# Patient Record
Sex: MALE | Employment: UNEMPLOYED | URBAN - METROPOLITAN AREA
[De-identification: names, ages, dates, MRNs, and addresses within clinical notes are randomized per-mention and may not be internally consistent; named-entity substitution may affect disease eponyms.]

---

## 2024-01-05 ENCOUNTER — HOSPITAL ENCOUNTER (INPATIENT)
Facility: CLINIC | Age: 39
LOS: 6 days | Discharge: NURSING, REHAB OR LONG-TERM CARE | DRG: 897 | End: 2024-01-12
Attending: EMERGENCY MEDICINE | Admitting: PSYCHIATRY & NEUROLOGY
Payer: MEDICARE

## 2024-01-05 ENCOUNTER — TELEPHONE (OUTPATIENT)
Dept: BEHAVIORAL HEALTH | Facility: CLINIC | Age: 39
End: 2024-01-05

## 2024-01-05 DIAGNOSIS — F10.90 ALCOHOL USE DISORDER: ICD-10-CM

## 2024-01-05 DIAGNOSIS — F25.1 SCHIZOAFFECTIVE DISORDER, DEPRESSIVE TYPE (H): ICD-10-CM

## 2024-01-05 LAB
ALBUMIN SERPL BCG-MCNC: 4.5 G/DL (ref 3.5–5.2)
ALBUMIN UR-MCNC: NEGATIVE MG/DL
ALCOHOL BREATH TEST: 0 (ref 0–0.01)
ALP SERPL-CCNC: 65 U/L (ref 40–150)
ALT SERPL W P-5'-P-CCNC: 12 U/L (ref 0–70)
AMMONIA PLAS-SCNC: 25 UMOL/L (ref 16–60)
AMPHETAMINES UR QL SCN: NORMAL
ANION GAP SERPL CALCULATED.3IONS-SCNC: 11 MMOL/L (ref 7–15)
APPEARANCE UR: CLEAR
AST SERPL W P-5'-P-CCNC: 19 U/L (ref 0–45)
BARBITURATES UR QL SCN: NORMAL
BASOPHILS # BLD AUTO: 0 10E3/UL (ref 0–0.2)
BASOPHILS NFR BLD AUTO: 0 %
BENZODIAZ UR QL SCN: NORMAL
BILIRUB SERPL-MCNC: 0.6 MG/DL
BILIRUB UR QL STRIP: NEGATIVE
BUN SERPL-MCNC: 6.6 MG/DL (ref 6–20)
BZE UR QL SCN: NORMAL
CALCIUM SERPL-MCNC: 9.5 MG/DL (ref 8.6–10)
CANNABINOIDS UR QL SCN: NORMAL
CHLORIDE SERPL-SCNC: 106 MMOL/L (ref 98–107)
COLOR UR AUTO: ABNORMAL
CREAT SERPL-MCNC: 0.76 MG/DL (ref 0.67–1.17)
DEPRECATED HCO3 PLAS-SCNC: 25 MMOL/L (ref 22–29)
EGFRCR SERPLBLD CKD-EPI 2021: >90 ML/MIN/1.73M2
EOSINOPHIL # BLD AUTO: 0.1 10E3/UL (ref 0–0.7)
EOSINOPHIL NFR BLD AUTO: 1 %
ERYTHROCYTE [DISTWIDTH] IN BLOOD BY AUTOMATED COUNT: 12.9 % (ref 10–15)
FENTANYL UR QL: NORMAL
GLUCOSE SERPL-MCNC: 114 MG/DL (ref 70–99)
GLUCOSE UR STRIP-MCNC: NEGATIVE MG/DL
HCT VFR BLD AUTO: 43.8 % (ref 40–53)
HGB BLD-MCNC: 15.3 G/DL (ref 13.3–17.7)
HGB UR QL STRIP: NEGATIVE
IMM GRANULOCYTES # BLD: 0 10E3/UL
IMM GRANULOCYTES NFR BLD: 0 %
KETONES UR STRIP-MCNC: ABNORMAL MG/DL
LACTATE SERPL-SCNC: 1.2 MMOL/L (ref 0.7–2)
LEUKOCYTE ESTERASE UR QL STRIP: NEGATIVE
LIPASE SERPL-CCNC: 17 U/L (ref 13–60)
LYMPHOCYTES # BLD AUTO: 0.9 10E3/UL (ref 0.8–5.3)
LYMPHOCYTES NFR BLD AUTO: 15 %
MAGNESIUM SERPL-MCNC: 1.6 MG/DL (ref 1.7–2.3)
MCH RBC QN AUTO: 32 PG (ref 26.5–33)
MCHC RBC AUTO-ENTMCNC: 34.9 G/DL (ref 31.5–36.5)
MCV RBC AUTO: 92 FL (ref 78–100)
MONOCYTES # BLD AUTO: 0.3 10E3/UL (ref 0–1.3)
MONOCYTES NFR BLD AUTO: 5 %
NEUTROPHILS # BLD AUTO: 4.7 10E3/UL (ref 1.6–8.3)
NEUTROPHILS NFR BLD AUTO: 79 %
NITRATE UR QL: NEGATIVE
NRBC # BLD AUTO: 0 10E3/UL
NRBC BLD AUTO-RTO: 0 /100
OPIATES UR QL SCN: NORMAL
PCP QUAL URINE (ROCHE): NORMAL
PH UR STRIP: 7 [PH] (ref 5–7)
PLATELET # BLD AUTO: 197 10E3/UL (ref 150–450)
POTASSIUM SERPL-SCNC: 3.8 MMOL/L (ref 3.4–5.3)
PROT SERPL-MCNC: 7.2 G/DL (ref 6.4–8.3)
RBC # BLD AUTO: 4.78 10E6/UL (ref 4.4–5.9)
RBC URINE: <1 /HPF
SODIUM SERPL-SCNC: 142 MMOL/L (ref 135–145)
SP GR UR STRIP: 1.01 (ref 1–1.03)
TROPONIN T SERPL HS-MCNC: <6 NG/L
UROBILINOGEN UR STRIP-MCNC: NORMAL MG/DL
WBC # BLD AUTO: 6 10E3/UL (ref 4–11)
WBC URINE: 0 /HPF

## 2024-01-05 PROCEDURE — 96366 THER/PROPH/DIAG IV INF ADDON: CPT | Performed by: EMERGENCY MEDICINE

## 2024-01-05 PROCEDURE — 99285 EMERGENCY DEPT VISIT HI MDM: CPT | Mod: 25 | Performed by: EMERGENCY MEDICINE

## 2024-01-05 PROCEDURE — 96361 HYDRATE IV INFUSION ADD-ON: CPT | Performed by: EMERGENCY MEDICINE

## 2024-01-05 PROCEDURE — 82140 ASSAY OF AMMONIA: CPT | Performed by: EMERGENCY MEDICINE

## 2024-01-05 PROCEDURE — 93005 ELECTROCARDIOGRAM TRACING: CPT | Performed by: EMERGENCY MEDICINE

## 2024-01-05 PROCEDURE — 85025 COMPLETE CBC W/AUTO DIFF WBC: CPT | Performed by: EMERGENCY MEDICINE

## 2024-01-05 PROCEDURE — 93010 ELECTROCARDIOGRAM REPORT: CPT | Performed by: EMERGENCY MEDICINE

## 2024-01-05 PROCEDURE — 80307 DRUG TEST PRSMV CHEM ANLYZR: CPT | Performed by: EMERGENCY MEDICINE

## 2024-01-05 PROCEDURE — 36415 COLL VENOUS BLD VENIPUNCTURE: CPT | Performed by: EMERGENCY MEDICINE

## 2024-01-05 PROCEDURE — 83690 ASSAY OF LIPASE: CPT | Performed by: EMERGENCY MEDICINE

## 2024-01-05 PROCEDURE — 96375 TX/PRO/DX INJ NEW DRUG ADDON: CPT | Performed by: EMERGENCY MEDICINE

## 2024-01-05 PROCEDURE — 96365 THER/PROPH/DIAG IV INF INIT: CPT | Performed by: EMERGENCY MEDICINE

## 2024-01-05 PROCEDURE — 82075 ASSAY OF BREATH ETHANOL: CPT | Performed by: EMERGENCY MEDICINE

## 2024-01-05 PROCEDURE — 258N000003 HC RX IP 258 OP 636: Performed by: EMERGENCY MEDICINE

## 2024-01-05 PROCEDURE — 80053 COMPREHEN METABOLIC PANEL: CPT | Performed by: EMERGENCY MEDICINE

## 2024-01-05 PROCEDURE — 84484 ASSAY OF TROPONIN QUANT: CPT | Performed by: EMERGENCY MEDICINE

## 2024-01-05 PROCEDURE — 83605 ASSAY OF LACTIC ACID: CPT | Performed by: EMERGENCY MEDICINE

## 2024-01-05 PROCEDURE — 250N000011 HC RX IP 250 OP 636: Performed by: EMERGENCY MEDICINE

## 2024-01-05 PROCEDURE — 250N000013 HC RX MED GY IP 250 OP 250 PS 637: Performed by: EMERGENCY MEDICINE

## 2024-01-05 PROCEDURE — 83735 ASSAY OF MAGNESIUM: CPT | Performed by: EMERGENCY MEDICINE

## 2024-01-05 PROCEDURE — 81001 URINALYSIS AUTO W/SCOPE: CPT | Performed by: EMERGENCY MEDICINE

## 2024-01-05 RX ORDER — SODIUM CHLORIDE, SODIUM LACTATE, POTASSIUM CHLORIDE, CALCIUM CHLORIDE 600; 310; 30; 20 MG/100ML; MG/100ML; MG/100ML; MG/100ML
INJECTION, SOLUTION INTRAVENOUS CONTINUOUS
Status: DISCONTINUED | OUTPATIENT
Start: 2024-01-05 | End: 2024-01-06

## 2024-01-05 RX ORDER — OLANZAPINE 7.5 MG/1
7.5 TABLET, FILM COATED ORAL AT BEDTIME
Status: ON HOLD | COMMUNITY
Start: 2023-03-23 | End: 2024-01-12

## 2024-01-05 RX ORDER — NALTREXONE HYDROCHLORIDE 50 MG/1
50 TABLET, FILM COATED ORAL DAILY
COMMUNITY

## 2024-01-05 RX ORDER — THIAMINE HYDROCHLORIDE 100 MG/ML
500 INJECTION, SOLUTION INTRAMUSCULAR; INTRAVENOUS ONCE
Status: COMPLETED | OUTPATIENT
Start: 2024-01-05 | End: 2024-01-05

## 2024-01-05 RX ORDER — DIAZEPAM 5 MG
5-20 TABLET ORAL EVERY 30 MIN PRN
Status: DISCONTINUED | OUTPATIENT
Start: 2024-01-05 | End: 2024-01-06

## 2024-01-05 RX ORDER — SODIUM CHLORIDE 9 MG/ML
INJECTION, SOLUTION INTRAVENOUS
Status: DISCONTINUED
Start: 2024-01-05 | End: 2024-01-05 | Stop reason: HOSPADM

## 2024-01-05 RX ORDER — DIAZEPAM 5 MG
5 TABLET ORAL ONCE
Status: COMPLETED | OUTPATIENT
Start: 2024-01-05 | End: 2024-01-05

## 2024-01-05 RX ORDER — HYDROXYZINE HYDROCHLORIDE 25 MG/1
25 TABLET, FILM COATED ORAL 2 TIMES DAILY PRN
Status: ON HOLD | COMMUNITY
End: 2024-01-12

## 2024-01-05 RX ORDER — MAGNESIUM SULFATE HEPTAHYDRATE 40 MG/ML
2 INJECTION, SOLUTION INTRAVENOUS ONCE
Status: COMPLETED | OUTPATIENT
Start: 2024-01-05 | End: 2024-01-05

## 2024-01-05 RX ORDER — POTASSIUM CHLORIDE 20MEQ/15ML
20 LIQUID (ML) ORAL ONCE
Status: COMPLETED | OUTPATIENT
Start: 2024-01-05 | End: 2024-01-05

## 2024-01-05 RX ADMIN — POTASSIUM CHLORIDE 20 MEQ: 40 SOLUTION ORAL at 17:09

## 2024-01-05 RX ADMIN — DIAZEPAM 5 MG: 5 TABLET ORAL at 15:59

## 2024-01-05 RX ADMIN — MAGNESIUM SULFATE HEPTAHYDRATE 2 G: 40 INJECTION, SOLUTION INTRAVENOUS at 17:09

## 2024-01-05 RX ADMIN — THIAMINE HYDROCHLORIDE 500 MG: 100 INJECTION, SOLUTION INTRAMUSCULAR; INTRAVENOUS at 15:57

## 2024-01-05 RX ADMIN — SODIUM CHLORIDE, POTASSIUM CHLORIDE, SODIUM LACTATE AND CALCIUM CHLORIDE: 600; 310; 30; 20 INJECTION, SOLUTION INTRAVENOUS at 15:59

## 2024-01-05 ASSESSMENT — ACTIVITIES OF DAILY LIVING (ADL)
ADLS_ACUITY_SCORE: 35

## 2024-01-05 NOTE — TELEPHONE ENCOUNTER
S: [facility] , Provider n/a calling at 5:50 PM with clinical on a 38 year old/Male presenting for alcohol detox.     B: Pt presents for ETOH detox.   Currently reports drinking 10-12 beers for months.    Patient reports last use was 12 hours ago.  Pt MICHELINE: 0  Pt  denies hx of DT  Pt  denies hx of seizures. Last seizure: N/A  Pt endorsing the following symptoms of withdrawal: Tremulous and Anxious  MSSA Score: 2 Per RN Sherine at 9:30pm 1/5/24    Pt endorses acute mental health or medical concerns.  Pt denies other drug use: Amount/frequency: N/A    Does Pt have a detox care plan in [facility]? No  Does pt present with specific needs, assistive devices, or exclusionary criteria? None  Is the patient ambulating, eating and drinking in the ED? Yes    A: Pt meets criteria to be presented for IP detox admission. Patient is voluntary    COVID Symptoms: No  If yes, COVID test required   Utox: Negative  Magnesium: Abnormalities: 1.6  CMP: Abnormalities: Glucose 114  CBC: WNL  HCG: N/A    Olanzapine regiment and is compliant.    R: Patient cleared and ready for behavioral bed placement: Yes    Pt is meeting criteria for presentation to 3A/MICD    Does Patient need a Transfer Center request created? No, Pt is located within [facility] ED, [facility] ED, or [facility]

## 2024-01-05 NOTE — ED TRIAGE NOTES
Patient states last drink was around 10pm last night, he drinks 10-12 hard ciders a day, denies a seizure history. Seeking detox.      Triage Assessment (Adult)       Row Name 01/05/24 7179          Triage Assessment    Airway WDL WDL        Respiratory WDL    Respiratory WDL WDL        Skin Circulation/Temperature WDL    Skin Circulation/Temperature WDL WDL        Cardiac WDL    Cardiac WDL WDL        Peripheral/Neurovascular WDL    Peripheral Neurovascular WDL WDL        Cognitive/Neuro/Behavioral WDL    Cognitive/Neuro/Behavioral WDL WDL

## 2024-01-05 NOTE — ED NOTES
Father has made this RN aware that patient has a psychodelic problem that patient likes to overdrink water and orange juice, thus, he should be given very little. He also cannot take certain antidepressants.

## 2024-01-05 NOTE — ED PROVIDER NOTES
"    [facility] EMERGENCY DEPARTMENT ([facility])    1/05/24      ED PROVIDER NOTE        History     Chief Complaint   Patient presents with    Alcohol Intoxication     Patient states last drink was around 10pm last night, he drinks 10-12 hard ciders a day, denies a seizure history. Seeking detox.      HPI  Naeem Murillo 38-year-old male with history of schizoaffective disorder.  Patient reports with his parents at bedside, his father is a physician.  His father reports patient has had history of polydipsia with hyponatremia in the setting of patient's 10-12 beers a day.  He drinks caffeine as well daily and takes olanzapine daily.  Father expresses concern for electrolyte abnormality and underlying psychoaffective schizoaffective disorder exacerbation.    Past Medical History  No past medical history on file.  No past surgical history on file.  No current outpatient medications on file.    Allergies   Allergen Reactions    Penicillins Rash     Family History  Family History   Problem Relation Age of Onset    Macular Degeneration No family hx of     Glaucoma No family hx of      Social History   Social History     Tobacco Use    Smoking status: Never         A medically appropriate review of systems was performed with pertinent positives and negatives noted in the HPI, and all other systems negative.    Physical Exam   BP: (!) 152/107  Pulse: 55  Temp: 98.2  F (36.8  C)  Resp: 18  Height: 180.3 cm (5' 11\")  Weight: 88 kg (193 lb 14.4 oz)  SpO2: 100 %  Physical Exam  Physical exam:  Patient is alert and awake but appears anxious.  Heart regular rate and rhythm, lungs clear, abdomen soft, non-tender, skin with mild diaphoresis.  ED Course, Procedures, & Data      Procedures            EKG Interpretation:      Interpreted by Luis Fernando Lawrence MD  Time reviewed: 1600  Symptoms at time of EKG: Alcohol withdrawal  Rhythm: normal sinus   Rate: 57 bpm  Axis: Normal  ST Segments/ T Waves: T wave inversions in V1 " through V4    Clinical Impression: Sinus bradycardia with T wave inversions anteriorly though no other acute ischemic changes                       Results for orders placed or performed during the hospital encounter of 01/05/24   Comprehensive metabolic panel     Status: Abnormal   Result Value Ref Range    Sodium 142 135 - 145 mmol/L    Potassium 3.8 3.4 - 5.3 mmol/L    Carbon Dioxide (CO2) 25 22 - 29 mmol/L    Anion Gap 11 7 - 15 mmol/L    Urea Nitrogen 6.6 6.0 - 20.0 mg/dL    Creatinine 0.76 0.67 - 1.17 mg/dL    GFR Estimate >90 >60 mL/min/1.73m2    Calcium 9.5 8.6 - 10.0 mg/dL    Chloride 106 98 - 107 mmol/L    Glucose 114 (H) 70 - 99 mg/dL    Alkaline Phosphatase 65 40 - 150 U/L    AST 19 0 - 45 U/L    ALT 12 0 - 70 U/L    Protein Total 7.2 6.4 - 8.3 g/dL    Albumin 4.5 3.5 - 5.2 g/dL    Bilirubin Total 0.6 <=1.2 mg/dL   Lipase     Status: Normal   Result Value Ref Range    Lipase 17 13 - 60 U/L   Lactic acid whole blood     Status: Normal   Result Value Ref Range    Lactic Acid 1.2 0.7 - 2.0 mmol/L   Ammonia     Status: Normal   Result Value Ref Range    Ammonia 25 16 - 60 umol/L   Magnesium     Status: Abnormal   Result Value Ref Range    Magnesium 1.6 (L) 1.7 - 2.3 mg/dL   Troponin T, High Sensitivity     Status: Normal   Result Value Ref Range    Troponin T, High Sensitivity <6 <=22 ng/L   CBC with platelets and differential     Status: None   Result Value Ref Range    WBC Count 6.0 4.0 - 11.0 10e3/uL    RBC Count 4.78 4.40 - 5.90 10e6/uL    Hemoglobin 15.3 13.3 - 17.7 g/dL    Hematocrit 43.8 40.0 - 53.0 %    MCV 92 78 - 100 fL    MCH 32.0 26.5 - 33.0 pg    MCHC 34.9 31.5 - 36.5 g/dL    RDW 12.9 10.0 - 15.0 %    Platelet Count 197 150 - 450 10e3/uL    % Neutrophils 79 %    % Lymphocytes 15 %    % Monocytes 5 %    % Eosinophils 1 %    % Basophils 0 %    % Immature Granulocytes 0 %    NRBCs per 100 WBC 0 <1 /100    Absolute Neutrophils 4.7 1.6 - 8.3 10e3/uL    Absolute Lymphocytes 0.9 0.8 - 5.3 10e3/uL     Absolute Monocytes 0.3 0.0 - 1.3 10e3/uL    Absolute Eosinophils 0.1 0.0 - 0.7 10e3/uL    Absolute Basophils 0.0 0.0 - 0.2 10e3/uL    Absolute Immature Granulocytes 0.0 <=0.4 10e3/uL    Absolute NRBCs 0.0 10e3/uL   Alcohol breath test POCT     Status: Normal   Result Value Ref Range    Alcohol Breath Test 0.000 0.00 - 0.01   CBC with platelets differential     Status: None    Narrative    The following orders were created for panel order CBC with platelets differential.  Procedure                               Abnormality         Status                     ---------                               -----------         ------                     CBC with platelets and d...[[id number]]                      Final result                 Please view results for these tests on the individual orders.     Medications   lactated ringers infusion ( Intravenous $New Bag 1/5/24 1559)   diazepam (VALIUM) tablet 5-20 mg (has no administration in time range)   magnesium sulfate 2 g in 50 mL sterile water intermittent infusion (has no administration in time range)   potassium chloride (KAYCIEL) solution 20 mEq (has no administration in time range)   diazepam (VALIUM) tablet 5 mg (5 mg Oral $Given 1/5/24 1559)   thiamine (B-1) injection 500 mg (500 mg Intravenous $Given 1/5/24 1557)     Labs Ordered and Resulted from Time of ED Arrival to Time of ED Departure   COMPREHENSIVE METABOLIC PANEL - Abnormal       Result Value    Sodium 142      Potassium 3.8      Carbon Dioxide (CO2) 25      Anion Gap 11      Urea Nitrogen 6.6      Creatinine 0.76      GFR Estimate >90      Calcium 9.5      Chloride 106      Glucose 114 (*)     Alkaline Phosphatase 65      AST 19      ALT 12      Protein Total 7.2      Albumin 4.5      Bilirubin Total 0.6     MAGNESIUM - Abnormal    Magnesium 1.6 (*)    LIPASE - Normal    Lipase 17     LACTIC ACID WHOLE BLOOD - Normal    Lactic Acid 1.2     AMMONIA - Normal    Ammonia 25     TROPONIN T, HIGH SENSITIVITY -  Normal    Troponin T, High Sensitivity <6     ALCOHOL BREATH TEST POCT - Normal    Alcohol Breath Test 0.000     CBC WITH PLATELETS AND DIFFERENTIAL    WBC Count 6.0      RBC Count 4.78      Hemoglobin 15.3      Hematocrit 43.8      MCV 92      MCH 32.0      MCHC 34.9      RDW 12.9      Platelet Count 197      % Neutrophils 79      % Lymphocytes 15      % Monocytes 5      % Eosinophils 1      % Basophils 0      % Immature Granulocytes 0      NRBCs per 100 WBC 0      Absolute Neutrophils 4.7      Absolute Lymphocytes 0.9      Absolute Monocytes 0.3      Absolute Eosinophils 0.1      Absolute Basophils 0.0      Absolute Immature Granulocytes 0.0      Absolute NRBCs 0.0     ROUTINE UA WITH MICROSCOPIC REFLEX TO CULTURE     No orders to display          Critical care was not performed.     Medical Decision Making  The patient's presentation was of high complexity (a chronic illness severe exacerbation, progression, or side effect of treatment).    The patient's evaluation involved:  ordering and/or review of 3+ test(s) in this encounter (see separate area of note for details)  discussion of management or test interpretation with another health professional (DEC  for mental health)    The patient's management necessitated high risk (a decision regarding hospitalization).    Assessment & Plan    Assessment and plan:  Patient likely in early withdrawal, though may have underlying exacerbation of psychiatric disorder.  Will treat with single dose of oral Valium initially and place on MSSA scale.  Will screen for active intoxication, electrolyte abnormality, organ failure.  Will consult DEC  to evaluate exacerbation of underlying mental health.   Will give gentle IV fluids until we understand patient's electrolyte status.  Will screen with EKG and will reassess.    5pm: Labs show no signs of large electrolyte abnormality though some signs of mild hypomagnesemia.  Given patient's history of alcohol use  disorder will replete magnesium and potassium while in the emergency department.  Will move towards inpatient detox admission if DEC assessment shows no signs of acute psychiatric complaints.      Signed out to evening ED physician pending DEC assessment  Clinical presentation has been given to mental health intake inpatient placement.  After DEC assessment, will admit to 3A  .      I have reviewed the nursing notes. I have reviewed the findings, diagnosis, plan and need for follow up with the patient.    New Prescriptions    No medications on file       Final diagnoses:   Alcohol use disorder   Schizoaffective disorder, depressive type (H)     Scribe Disclosure:   I, Andre Anderson, am serving as a scribe; to document services personally performed by No name on file -based on data collection and the provider's statements to me.     Provider Disclosure:  I agree with above History, Review of Systems, Physical exam and Plan.  I have reviewed the content of the documentation and have edited it as needed. I have personally performed the services documented here and the documentation accurately represents those services and the decisions I have made.      Electronically signed by:  Luis Fernando Lawrence MD  [facility] EMERGENCY DEPARTMENT  1/5/2024           Luis Fernando Lawrence MD  01/05/24 170       Luis Fernando Lawrence MD  01/05/24 1712

## 2024-01-06 ENCOUNTER — TELEPHONE (OUTPATIENT)
Dept: BEHAVIORAL HEALTH | Facility: CLINIC | Age: 39
End: 2024-01-06
Payer: MEDICARE

## 2024-01-06 LAB
ATRIAL RATE - MUSE: 57 BPM
DIASTOLIC BLOOD PRESSURE - MUSE: NORMAL MMHG
INTERPRETATION ECG - MUSE: NORMAL
P AXIS - MUSE: 23 DEGREES
PR INTERVAL - MUSE: 170 MS
QRS DURATION - MUSE: 90 MS
QT - MUSE: 462 MS
QTC - MUSE: 449 MS
R AXIS - MUSE: 64 DEGREES
SYSTOLIC BLOOD PRESSURE - MUSE: NORMAL MMHG
T AXIS - MUSE: 50 DEGREES
VENTRICULAR RATE- MUSE: 57 BPM

## 2024-01-06 PROCEDURE — 128N000001 HC R&B CD/MH ADULT

## 2024-01-06 PROCEDURE — 128N000004 HC R&B CD ADULT

## 2024-01-06 PROCEDURE — 250N000013 HC RX MED GY IP 250 OP 250 PS 637: Performed by: PSYCHIATRY & NEUROLOGY

## 2024-01-06 PROCEDURE — HZ2ZZZZ DETOXIFICATION SERVICES FOR SUBSTANCE ABUSE TREATMENT: ICD-10-PCS | Performed by: PSYCHIATRY & NEUROLOGY

## 2024-01-06 PROCEDURE — 99222 1ST HOSP IP/OBS MODERATE 55: CPT | Performed by: PSYCHIATRY & NEUROLOGY

## 2024-01-06 PROCEDURE — 99222 1ST HOSP IP/OBS MODERATE 55: CPT | Performed by: PHYSICIAN ASSISTANT

## 2024-01-06 PROCEDURE — 96361 HYDRATE IV INFUSION ADD-ON: CPT | Performed by: EMERGENCY MEDICINE

## 2024-01-06 PROCEDURE — 250N000013 HC RX MED GY IP 250 OP 250 PS 637: Performed by: STUDENT IN AN ORGANIZED HEALTH CARE EDUCATION/TRAINING PROGRAM

## 2024-01-06 PROCEDURE — 258N000003 HC RX IP 258 OP 636: Performed by: EMERGENCY MEDICINE

## 2024-01-06 RX ORDER — FOLIC ACID 1 MG/1
1 TABLET ORAL DAILY
Status: DISCONTINUED | OUTPATIENT
Start: 2024-01-06 | End: 2024-01-12 | Stop reason: HOSPADM

## 2024-01-06 RX ORDER — ATENOLOL 50 MG/1
50 TABLET ORAL DAILY PRN
Status: DISCONTINUED | OUTPATIENT
Start: 2024-01-06 | End: 2024-01-12 | Stop reason: HOSPADM

## 2024-01-06 RX ORDER — TRAZODONE HYDROCHLORIDE 50 MG/1
50 TABLET, FILM COATED ORAL
Status: DISCONTINUED | OUTPATIENT
Start: 2024-01-06 | End: 2024-01-12 | Stop reason: HOSPADM

## 2024-01-06 RX ORDER — GABAPENTIN 100 MG/1
100 CAPSULE ORAL EVERY 6 HOURS PRN
Status: DISCONTINUED | OUTPATIENT
Start: 2024-01-06 | End: 2024-01-06

## 2024-01-06 RX ORDER — MAGNESIUM HYDROXIDE/ALUMINUM HYDROXICE/SIMETHICONE 120; 1200; 1200 MG/30ML; MG/30ML; MG/30ML
30 SUSPENSION ORAL EVERY 4 HOURS PRN
Status: DISCONTINUED | OUTPATIENT
Start: 2024-01-06 | End: 2024-01-12 | Stop reason: HOSPADM

## 2024-01-06 RX ORDER — MULTIPLE VITAMINS W/ MINERALS TAB 9MG-400MCG
1 TAB ORAL DAILY
Status: DISCONTINUED | OUTPATIENT
Start: 2024-01-06 | End: 2024-01-12 | Stop reason: HOSPADM

## 2024-01-06 RX ORDER — ACETAMINOPHEN 325 MG/1
650 TABLET ORAL EVERY 4 HOURS PRN
Status: DISCONTINUED | OUTPATIENT
Start: 2024-01-06 | End: 2024-01-12 | Stop reason: HOSPADM

## 2024-01-06 RX ORDER — AMOXICILLIN 250 MG
1 CAPSULE ORAL 2 TIMES DAILY PRN
Status: DISCONTINUED | OUTPATIENT
Start: 2024-01-06 | End: 2024-01-12 | Stop reason: HOSPADM

## 2024-01-06 RX ORDER — DIAZEPAM 5 MG
5-20 TABLET ORAL EVERY 30 MIN PRN
Status: DISCONTINUED | OUTPATIENT
Start: 2024-01-06 | End: 2024-01-12 | Stop reason: HOSPADM

## 2024-01-06 RX ORDER — HYDROXYZINE HYDROCHLORIDE 25 MG/1
25 TABLET, FILM COATED ORAL 2 TIMES DAILY PRN
Status: DISCONTINUED | OUTPATIENT
Start: 2024-01-06 | End: 2024-01-12 | Stop reason: HOSPADM

## 2024-01-06 RX ADMIN — TRAZODONE HYDROCHLORIDE 50 MG: 50 TABLET ORAL at 21:07

## 2024-01-06 RX ADMIN — Medication 1 TABLET: at 09:01

## 2024-01-06 RX ADMIN — THIAMINE HCL TAB 100 MG 100 MG: 100 TAB at 09:01

## 2024-01-06 RX ADMIN — ACETAMINOPHEN 650 MG: 325 TABLET, FILM COATED ORAL at 16:37

## 2024-01-06 RX ADMIN — SODIUM CHLORIDE, POTASSIUM CHLORIDE, SODIUM LACTATE AND CALCIUM CHLORIDE: 600; 310; 30; 20 INJECTION, SOLUTION INTRAVENOUS at 02:39

## 2024-01-06 RX ADMIN — HYDROXYZINE HYDROCHLORIDE 25 MG: 25 TABLET, FILM COATED ORAL at 09:01

## 2024-01-06 RX ADMIN — OLANZAPINE 7.5 MG: 5 TABLET, FILM COATED ORAL at 21:07

## 2024-01-06 RX ADMIN — FOLIC ACID 1 MG: 1 TABLET ORAL at 09:01

## 2024-01-06 ASSESSMENT — ACTIVITIES OF DAILY LIVING (ADL)
ADLS_ACUITY_SCORE: 45
LAUNDRY: WITH SUPERVISION
DRESS: WITH ASSISTANCE
DRESS: INDEPENDENT
ADLS_ACUITY_SCORE: 28
ADLS_ACUITY_SCORE: 28
ADLS_ACUITY_SCORE: 35
ORAL_HYGIENE: INDEPENDENT
LAUNDRY: WITH SUPERVISION
ADLS_ACUITY_SCORE: 28
ADLS_ACUITY_SCORE: 35
HYGIENE/GROOMING: INDEPENDENT
HYGIENE/GROOMING: INDEPENDENT
ADLS_ACUITY_SCORE: 38
ADLS_ACUITY_SCORE: 28
ORAL_HYGIENE: INDEPENDENT
ADLS_ACUITY_SCORE: 28
ADLS_ACUITY_SCORE: 28

## 2024-01-06 NOTE — PROGRESS NOTES
01/06/24 0501   Patient Belongings   Did you bring any home meds/supplements to the hospital?  No   Patient Belongings other (see comments)   Patient Belongings Put in Hospital Secure Location (Security or Locker, etc.) other (see comments)   Belongings Search Yes   Clothing Search Yes   Second Staff Pilo and Pascual     Storage Bin: Jacket, vest, socks, shoes, gloves, 2 pants.   Med-Room Bin: Wallet, phone, portable , adaptor.  Security Envelope (32721):  license, visa card, master card, 25 dollars in cash, EBT card.   ADMISSION:    I am responsible for any personal items that are not sent to the safe or pharmacy. [facility] is not responsible for loss, theft or damage of any property in my possession.    Patient Signature _________________________________________ Date/Time _____________________    Staff Signature ___________________________________________ Date/Time _____________________    [facility] Staff person, if patient is unable/unwilling to sign    ________________________________________________________ Date/Time _____________________    DISCHARGE:    All personal items have been returned to me.    Patient Signature _________________________________________ Date/Time _____________________    Staff Signature ___________________________________________ Date/Time _____________________

## 2024-01-06 NOTE — PROGRESS NOTES
"SENIA      Naeem Murillo is a 38 year old year old male with a chief complaint of Alcohol Intoxication (Patient states last drink was around 10pm last night, he drinks 10-12 hard ciders a day, denies a seizure history. Seeking detox. )        S = Situation:   Pt is a 39yo male admitted from the [facility] ED for alcohol detox.  Voluntary admission, first time to . BIB his parents.      B  = Background:   Pt arrived on the unit at about 0440.   Pt reports hx of schizoaffective d/o, notes drinking 10-12 cans of beers and ciders daily for about a month and a half. Occasionally drinks THC beverages, last drank 3 days ago. States he started drinking, and it became a problem, at age 17, his first year of college. Denied using any other chemicals.  Lives in a group home in [facility], which he does not believe will take him back due to hiding and drinking when he was prohibited from doing so.    Detox Hx: 2022, 12days at [address], first detox.  Treatment: spent a little over a month at [facility], 4 months at [facility], from Jan to May 2023, at [facility].     He was sober for 2-3 months before he was hospitalized for \" alcohol poisoning\" June of last year.   He was secretly fermenting orange juice to alcohol, especially at the , while working for [employer] on a bike. Pt states when it got cold, his craving went up, so he started drinking again.     Right now, he does not know what his goal is, will leave that up to his parents who he states are very supportive.   He said they hired a private  for him since 2019 who is knowledgeable of everything he has going on.          Breath alcohol 0.00  Pt was given a does of valium in the ED. Low Mg was replaced, got a bolus of lactated Rigers.   DEC assessment done in the ED which indicates no concern for SI/HI.     A  =  Assessment:   MSSA: 1, requiring no valium on admission.   Pt reports taking his " medications which was managed by the  staff.....  Hydroxyzine, naltrexone and zyprexa.     B/P: 130/81, T: 97.2, P: 47, R: 18   R =   Request or Recommendation:   Pt will be admitting to dr Chadwick, to be followed by the hospitalist team to co manage detox, he will have CM assigned as well.

## 2024-01-06 NOTE — PLAN OF CARE
Naeem CUAUHTEMOC Chidi  January 5, 2024  Plan of Care Hand-off Note     Patient Care Path: inpatient mental health    Plan for Care:   After therapeutic assessment and intervention by ED care team and LM and in consultation with attending provider, the patient's circumstances and mental state were appropriate for admission to detox. Pt may benefit from MICD bed placement on the unit. Pt presents seeking detox as he has been drinking approximately 10-12 hard ciders per day, with last drink being around 10 pm last night. He has been drinking daily and also reports heavily using caffeine as well. Pt denies any SI, HI, AH, VH or NSSI at the time of assessment. Pt does have a hx of schizoaffective disorder and is prescribed olanzapine. He reports he has been compliant with his medication. No obvious signs of current keron or psychosis observed. He reports he can be safe on the unit and is agreeable to seeking MICD residential treatment once he is ready to discharge from detox. He is interested in going to [facility], but is open to suggestions where there are openings. Pt is currently a resident of a group home, though he is not sure if he is able to return there ([facility] [phone]). His parents feel it would be best for pt to complete CD treatment before returning to the group home.    Identified Goals and Safety Issues: stabilization and reduction of symptoms, plan for 3A admission    Overview:  Solomon Murillo, father, [phone]     Madison Murillo, mother, [phone]        Legal Status: Legal Status at Admission: Voluntary/Patient has signed consent for treatment    Psychiatry Consult: No, not at this time       Updated MD regarding plan of care.           NICHELLE Rayo

## 2024-01-06 NOTE — MEDICATION SCRIBE - ADMISSION MEDICATION HISTORY
Medication Scribe Admission Medication History    Admission medication history is complete. The information provided in this note is only as accurate as the sources available at the time of the update.    Information Source(s): Patient, Facility (TCU/NH/) medication list/MAR, and CareEverywhere/SureScripts via in-person and phone    Pertinent Information: Medication history completed with patient and patients [facility] for doses & instructions.  staff name Daisy at [phone].    Changes made to PTA medication list:  Added: zyprexa, hydroxyzine, naltrexone  Deleted: None  Changed: None    Medication Affordability:       Allergies reviewed with patient and updates made in EHR: yes    Medication History Completed By: Catie Virgen 1/5/2024 6:26 PM    PTA Med List   Medication Sig Last Dose    hydrOXYzine HCl (ATARAX) 25 MG tablet Take 25 mg by mouth 2 times daily as needed for anxiety Past Month    naltrexone (DEPADE/REVIA) 50 MG tablet Take 50 mg by mouth daily 1/5/2024 at am    OLANZapine (ZYPREXA) 7.5 MG tablet Take 7.5 mg by mouth at bedtime 1/4/2024 at 8pm

## 2024-01-06 NOTE — ED PROVIDER NOTES
Emergency Department Patient Sign-out       Brief HPI:  This is a 38 year old male signed out to me by Dr. Lawrence .  See initial ED Provider note for details of the presentation.  In summary patient arrived for alcohol detox, he was provided Valium per MSSA scale.  Electrolytes were repleted.  Since patient does have history of schizoaffective disorder, DEC assessment had been placed to evaluate for exacerbation of psychiatric disorder.    Dr. Lawrence had performed clinical presentation to mental health intake inpatient placement, and he was accepted for admission to , pending DEC assessment to rule out any immediate mental health concerns.  I was informed by the DEC  that they met with the patient and family at bedside, and had a thorough discussion.  They do not feel there are any immediate psychiatric concerns.  No SI or HI.  They feel that the patient either could obtain an MICD bed on 3A or the patient and family are open to a dual program after detox.  I spoke with mental health intake inpatient placement regarding this update, to complete his admission process.     Arabella Rivera MD  01/05/24 2029

## 2024-01-06 NOTE — PLAN OF CARE
"Goal Outcome Evaluation:    Plan of Care Reviewed With: patient      Patient is up and visible in the milieu for breakfast before shortly retreating back to his room. Patient is ambulating independently, balanced and steady. Patient is alert and oriented x 4. Patient is attending/participating in unit programming. Pt is social with peers. Affect is blunted/flat, mood is anxious and depressed. Patient rates anxiety a 4/10 and depression a 5/10 on the 1-10 severity scale. Patient denies SI/SIB/HI. Pt denies auditory/visual hallucinations. Patient verbally contracts for safety on the unit.     This RN administered the PRN medications hydroxyzine 25mg x 1 for anxiety, (see MAR) at the request of the patient. Patient is tolerating medications well, denies any current side effects.     Pt's MSSA = 3 upon first morning withdrawal assessment. Patient at that time exhibiting minimal withdrawal symptoms. Patient reports a good appetite, and poor sleep related to a late admission. Patient discharge plans pending. Rest, fluids, and food encouraged. Status 15 checks remain. Patient denies any unmet needs at this time.     Blood pressure 125/85, pulse 50, temperature 97  F (36.1  C), temperature source Temporal, resp. rate 18, height 1.803 m (5' 11\"), weight 87.5 kg (193 lb), SpO2 99%.     Update:   Patient MSSA at lunchtime = 2.     Blood pressure 127/80, pulse 67, temperature 97.2  F (36.2  C), temperature source Temporal, resp. rate 16, height 1.803 m (5' 11\"), weight 87.5 kg (193 lb), SpO2 99%.    "

## 2024-01-06 NOTE — PROGRESS NOTES
Case Management:     Writer checked-in and introduced role to pt's care and how to assist pt during their stay. Inquired with pt about what they are needing during their stay and what they are considering for their aftercare plans. Pt processed that parent's brought him here. Pt shared some of his history noting that he is currently residing at [facility] in [place]. Pt shared that he is on a CADI waiver through [facility]. Pt shared that he has a long history of Mental health and substance use and being in and out of treatments. Pt discussed that he needs to run his plan by his parent's noting that they currently are the power of  however haven't enacted the plan yet. Pt shared that he has been drinking for the past 2.5 months noting that prior to that he was sober. Pt identified that he was sober due to having structure, he had temporary where house job and felt fullfiled. Pt shared that with the ending of his job and has been doing [employer] sporadically lead to his relapse. Pt shared that he was doing AA regularly but stopped when he relapsed. Processed with pt about looking into a daily IOP program and having individual therapy. Pt shared that he did therapy telehealth but would prefer an in person therapist and would feel that a daily IOP program would provide him structure and how to manage his recovery in the community with the hopes to to get a job. CM encouraged pt to complete the MIGUEL assessment paper work noting pt will need one.    CM spoke with pt's father and mother due to pt's request and completing the DEANN. Parent's provided an in depth history of pt noting that pt has a significant mental illness with diagnosis of Schizophrenia and how his MH impacts his addiction. Parent's shared an lengthy history of treatment and feel that pt is needing to be on a good regimen of psychotropic medications noting that when pt in the past was on injectables provided pt with more  stability. Parent's shared that pt is at a group home that is holding his bed. Discussed with parent's about pt would benefit from a daily intensive outpatient program with a MH and MIGUEL. Discussed getting pt established with a psychiatrist that can help with his medications and helping him with being more adhere to his medications. Parent's noted that they are open and feel that addressing pt's MH and MIGUEL is key to helping pt with his recovery.     Insurance:  Medicare/Medicare  [facility]    Contacts:  Solomon Murillo (father cherie signed) [phone]    Legal Status  Voluntary    Substance Use Assessment Status:  Pt will need an MIGUEL assessment, none on file. Awaiting pt's completion of MIGUEL assessment paper work    Referral Status:  None at this time awaiting results of pt's MIGUEL assessment.  Considerations: Dual Intensive outpatient program and individual therapy and psychiatry.    Established Services  None at this time outside of being in a group home    Next Steps and Discharge Plan or Goal:  CM to follow up with pt tomorrow regarding the completion of his MIGUEL assessment paper work. P    Name/Credentials Maria Luisa Julian, JOSE DC, [facility]

## 2024-01-06 NOTE — H&P
"[facility], [place]   Psychiatric History and Physical  Admission date: 1/5/2024        Chief Complaint:   \"Alcohol\"         HPI:     The patient is a 37yo male with a history of alcohol use disorder and schizoaffective disorder who was admitted to detoxify from alcohol. He reports that he is \"all right.\" Is feeling tired and didn't get much sleep last night. Mood is anxious but denies any SI. Denies AH or VH. Denies nausea but doesn't have much of an appetite. Feels that Zyprexa makes him \"sleepy\". Is taking Naltrexone but isn't sure if it is helpful. Is open to CD treatment.     Per ER:  Naeem Murillo 38-year-old male with history of schizoaffective disorder.  Patient reports with his parents at bedside, his father is a physician.  His father reports patient has had history of polydipsia with hyponatremia in the setting of patient's 10-12 beers a day.  He drinks caffeine as well daily and takes olanzapine daily.  Father expresses concern for electrolyte abnormality and underlying psychoaffective schizoaffective disorder exacerbation.         Past Psychiatric History:     Schizoaffective disorder. On Zyprexa. History of PTSD.   DEC assessment states no history of suicide attempts or SIB.         Substance Use and History:     Alcohol use disorder. Denies a history of withdrawal seizures. Does endorse a history of DTs. On Naltrexone most recently.         Past Medical History:   PAST MEDICAL HISTORY: No past medical history on file.    PAST SURGICAL HISTORY: No past surgical history on file.          Family History:   FAMILY HISTORY: Records indicate no history of MI or CD issues in his family.   Family History   Problem Relation Age of Onset    Macular Degeneration No family hx of     Glaucoma No family hx of            Social History:   Please see the full psychosocial profile from the clinical treatment coordinator.   SOCIAL HISTORY:   Social History     Tobacco Use    Smoking " status: Never    Smokeless tobacco: Not on file   Substance Use Topics    Alcohol use: Not on file            Physical ROS:   The patient endorsed fatigue. The remainder of 10-point review of systems was negative except as noted in HPI.         PTA Medications:     Medications Prior to Admission   Medication Sig Dispense Refill Last Dose    hydrOXYzine HCl (ATARAX) 25 MG tablet Take 25 mg by mouth 2 times daily as needed for anxiety   Past Month    naltrexone (DEPADE/REVIA) 50 MG tablet Take 50 mg by mouth daily   1/5/2024 at am    OLANZapine (ZYPREXA) 7.5 MG tablet Take 7.5 mg by mouth at bedtime   1/4/2024 at 8pm          Allergies:     Allergies   Allergen Reactions    Penicillins Rash          Labs:     Recent Results (from the past 48 hour(s))   Alcohol breath test POCT    Collection Time: 01/05/24  2:13 PM   Result Value Ref Range    Alcohol Breath Test 0.000 0.00 - 0.01   Comprehensive metabolic panel    Collection Time: 01/05/24  3:55 PM   Result Value Ref Range    Sodium 142 135 - 145 mmol/L    Potassium 3.8 3.4 - 5.3 mmol/L    Carbon Dioxide (CO2) 25 22 - 29 mmol/L    Anion Gap 11 7 - 15 mmol/L    Urea Nitrogen 6.6 6.0 - 20.0 mg/dL    Creatinine 0.76 0.67 - 1.17 mg/dL    GFR Estimate >90 >60 mL/min/1.73m2    Calcium 9.5 8.6 - 10.0 mg/dL    Chloride 106 98 - 107 mmol/L    Glucose 114 (H) 70 - 99 mg/dL    Alkaline Phosphatase 65 40 - 150 U/L    AST 19 0 - 45 U/L    ALT 12 0 - 70 U/L    Protein Total 7.2 6.4 - 8.3 g/dL    Albumin 4.5 3.5 - 5.2 g/dL    Bilirubin Total 0.6 <=1.2 mg/dL   Lipase    Collection Time: 01/05/24  3:55 PM   Result Value Ref Range    Lipase 17 13 - 60 U/L   Lactic acid whole blood    Collection Time: 01/05/24  3:55 PM   Result Value Ref Range    Lactic Acid 1.2 0.7 - 2.0 mmol/L   Ammonia    Collection Time: 01/05/24  3:55 PM   Result Value Ref Range    Ammonia 25 16 - 60 umol/L   Magnesium    Collection Time: 01/05/24  3:55 PM   Result Value Ref Range    Magnesium 1.6 (L) 1.7 - 2.3  mg/dL   Troponin T, High Sensitivity    Collection Time: 01/05/24  3:55 PM   Result Value Ref Range    Troponin T, High Sensitivity <6 <=22 ng/L   CBC with platelets and differential    Collection Time: 01/05/24  3:55 PM   Result Value Ref Range    WBC Count 6.0 4.0 - 11.0 10e3/uL    RBC Count 4.78 4.40 - 5.90 10e6/uL    Hemoglobin 15.3 13.3 - 17.7 g/dL    Hematocrit 43.8 40.0 - 53.0 %    MCV 92 78 - 100 fL    MCH 32.0 26.5 - 33.0 pg    MCHC 34.9 31.5 - 36.5 g/dL    RDW 12.9 10.0 - 15.0 %    Platelet Count 197 150 - 450 10e3/uL    % Neutrophils 79 %    % Lymphocytes 15 %    % Monocytes 5 %    % Eosinophils 1 %    % Basophils 0 %    % Immature Granulocytes 0 %    NRBCs per 100 WBC 0 <1 /100    Absolute Neutrophils 4.7 1.6 - 8.3 10e3/uL    Absolute Lymphocytes 0.9 0.8 - 5.3 10e3/uL    Absolute Monocytes 0.3 0.0 - 1.3 10e3/uL    Absolute Eosinophils 0.1 0.0 - 0.7 10e3/uL    Absolute Basophils 0.0 0.0 - 0.2 10e3/uL    Absolute Immature Granulocytes 0.0 <=0.4 10e3/uL    Absolute NRBCs 0.0 10e3/uL   EKG 12-lead, tracing only    Collection Time: 01/05/24  3:58 PM   Result Value Ref Range    Systolic Blood Pressure  mmHg    Diastolic Blood Pressure  mmHg    Ventricular Rate 57 BPM    Atrial Rate 57 BPM    OK Interval 170 ms    QRS Duration 90 ms     ms    QTc 449 ms    P Axis 23 degrees    R AXIS 64 degrees    T Axis 50 degrees    Interpretation ECG       Sinus bradycardia  T wave abnormality, consider anterior ischemia  Abnormal ECG     UA with Microscopic reflex to Culture    Collection Time: 01/05/24  5:40 PM    Specimen: Urine, Clean Catch   Result Value Ref Range    Color Urine Straw Colorless, Straw, Light Yellow, Yellow    Appearance Urine Clear Clear    Glucose Urine Negative Negative mg/dL    Bilirubin Urine Negative Negative    Ketones Urine Trace (A) Negative mg/dL    Specific Gravity Urine 1.008 1.003 - 1.035    Blood Urine Negative Negative    pH Urine 7.0 5.0 - 7.0    Protein Albumin Urine Negative  "Negative mg/dL    Urobilinogen Urine Normal Normal, 2.0 mg/dL    Nitrite Urine Negative Negative    Leukocyte Esterase Urine Negative Negative    RBC Urine <1 <=2 /HPF    WBC Urine 0 <=5 /HPF   Urine Drug Screen Panel    Collection Time: 01/05/24  5:40 PM   Result Value Ref Range    Amphetamines Urine Screen Negative Screen Negative    Barbituates Urine Screen Negative Screen Negative    Benzodiazepine Urine Screen Negative Screen Negative    Cannabinoids Urine Screen Negative Screen Negative    Cocaine Urine Screen Negative Screen Negative    Fentanyl Qual Urine Screen Negative Screen Negative    Opiates Urine Screen Negative Screen Negative    PCP Urine Screen Negative Screen Negative          Physical and Psychiatric Examination:     BP (!) 144/88   Pulse 94   Temp 97.3  F (36.3  C)   Resp 18   Ht 1.803 m (5' 11\")   Wt 88 kg (193 lb 14.4 oz)   SpO2 99%   BMI 27.04 kg/m    Weight is 193 lbs 14.4 oz  Body mass index is 27.04 kg/m .    Physical Exam:  I have reviewed the physical exam as documented by the medical team and agree with findings and assessment and have no additional findings to add at this time.    Mental Status Exam:  Appearance: awake, alert and adequately groomed  Attitude:  cooperative  Eye Contact:  fair  Mood:  anxious  Affect:  mood congruent  Speech:  clear, coherent  Language: fluent and intact in English  Psychomotor, Gait, Musculoskeletal:  no evidence of tardive dyskinesia, dystonia, or tics  Thought Process:  goal oriented  Associations:  no loose associations  Thought Content:  no evidence of suicidal ideation or homicidal ideation and no evidence of psychotic thought  Insight:  fair  Judgement:  fair  Oriented to:  time, person, and place  Attention Span and Concentration:  intact  Recent and Remote Memory:  fair  Fund of Knowledge:  appropriate         Admission Diagnoses:      Alcohol use disorder, severe  Alcohol withdrawal with unspecified complication   Schizoaffective " "disorder, depressive type (H)    Clinically Significant Risk Factors Present on Admission         # Hypomagnesemia: Lowest Mg = 1.6 mg/dL in last 2 days, will replace as needed            # Overweight: Estimated body mass index is 27.04 kg/m  as calculated from the following:    Height as of this encounter: 1.803 m (5' 11\").    Weight as of this encounter: 88 kg (193 lb 14.4 oz).       # Financial/Environmental Concerns: none               Assessment & Plan:     1) MSSA with Valium.   2) Continue Zyprexa 7.5mg at bedtime.   3) Nicotine replacement available.   4) Will consider restarting Naltrexone versus other MAT for AUD.   5) Patient open to CD treatment.     Disposition Plan   Reason for ongoing admission: requires detoxification from substance that poses a risk of bodily harm during withdrawal period  Discharge location: Chemical dependency treatment facility  Discharge Medications: not ordered  Follow-up Appointments: not scheduled  Legal Status: voluntary    Entered by: Carson Chadwick MD on 1/6/2024 at 5:12 AM             "

## 2024-01-06 NOTE — CONSULTS
[facility]  Consult Note - Hospitalist Service  Date of Admission:  1/5/2024  Consult Requested by: Dr. Chadwick  Reason for Consult: Medical co-management     Assessment & Plan   Naeem Murillo is a 38 year old male admitted on 1/5/2024. He has a past medical history significant for schizoaffective disorder and alcohol use disorder. Admitted to station 3A for alcohol withdrawal.     Alcohol withdrawal, hx of alcohol abuse   Drinking 10-12 hard ciders/beers daily x 2.5 months. Denies hx of withdrawal seizures or DT.   - Continue MSSA   - Folvite, multi-vites, thiamine supplementation   - Further management per Psychiatry     Schizoaffective disorder  - Zyprexa 7.5 mg at bedtime, hydroxyzine 25 mg BID PRN     Hypomagnesemia  Mg 1.6. S/p IV mag sulfate 2g in the ED.    Internal Medicine will sign off. Please ensure patient has follow-up with PCP within 1-2 weeks of discharge for repeat CMP/CBC and hospital follow-up. Do not hesitate to reach out with any further questions or concerns in the interim.       Rosalba Dominique PA-C  Hospitalist Service  Securely message with [email or web address] (more info)  Text page via [facility] Paging/Directory   ______________________________________________________________________    Chief Complaint   Alcohol withdrawal    History is obtained from the patient    History of Present Illness   Naeem Murillo is a 38 year old male who is admitted for alcohol use and detox. Drinking 10-12 cans of beer or hard cider most days for the last 2.5 months. No hx of withdrawal seizures or Dts. Reports shakes, anxiety. Feels mostly tired today. Ate breakfast without issue. Reports hx of hypertension at times, not currently on medications. No additional past medical history.    Past Medical History    No past medical history on file.    Past Surgical History   No past surgical history on file.    Medications   I have reviewed this patient's current  medications       Review of Systems    The 5 point Review of Systems is negative other than noted in the HPI or here.      Physical Exam   Vital Signs: Temp: 97  F (36.1  C) Temp src: Temporal BP: 125/85 Pulse: 50   Resp: 18 SpO2: 99 % O2 Device: None (Room air)    Weight: 193 lbs 0 oz    General Appearance: Awake. Alert and oriented x4. Sitting up in chair. No acute distress.  Eyes: Normal lids. Anicteric sclera. Pupils equal and round.   HEENT: Normocephalic, atraumatic. Nares patent. Mucous membranes moist.   Respiratory: Normal work of breathing on room air. Lungs CTAB.   Cardiovascular: RRR. No murmurs.   GI: Abdomen non-distended. Normoactive. Soft, non-tender.   Lymph/Hematologic: No abnormal or excessive bruising on exposed skin.   Skin: Warm, dry. No jaundice.   Musculoskeletal: Normal gait. Moves all extremities equally.   Neurologic: No focal deficits.     Medical Decision Making       ------------------ MEDICAL DECISION MAKING ------------------------------------------------------------------------------------------------------  MANAGEMENT DISCUSSED with the following over the past 24 hours: Bedside RN   NOTE(S)/MEDICAL RECORDS REVIEWED over the past 24 hours: ED provider notes, outside hospital records  Tests REVIEWED in the past 24 hours:  - CMP  - CBC  - Lactic Acid  - Magnesium  Medical complexity over the past 24 hours:  -------------------------- HIGH RISK FOR MORBIDITY -------------------------------------------------------------  - Monitoring and management of high risk medication use for alcohol withdrawal       Data     I have personally reviewed the following data over the past 24 hrs:    6.0  \   15.3   / 197     142 106 6.6 /  114 (H)   3.8 25 0.76 \     ALT: 12 AST: 19 AP: 65 TBILI: 0.6   ALB: 4.5 TOT PROTEIN: 7.2 LIPASE: 17     Trop: <6 BNP: N/A     Procal: N/A CRP: N/A Lactic Acid: 1.2         Imaging results reviewed over the past 24 hrs:   No results found for this or any previous visit  (from the past 24 hour(s)).

## 2024-01-06 NOTE — CONSULTS
"Diagnostic Evaluation Consultation  Crisis Assessment    Patient Name: Naeem Murillo  Age:  38 year old  Legal Sex: male  Gender Identity: male  Pronouns:   Race: Choose not to Answer  Ethnicity: Choose not to answer  Language: English      Patient was assessed: In person      Patient location: [facility] EMERGENCY DEPARTMENT                             ED15    Referral Data and Chief Complaint  Naeem Murillo presents to the ED with family/friends. Patient is presenting to the ED for the following concerns: Substance use, Other (see comment) (Pt's father expresses concern for underlying schizoaffective disorder exacerbation.).   Factors that make the mental health crisis life threatening or complex are:  Pt presents to [facility] ED with his parents for an evaluation and seeking detox. Pt has been drinking approximately 10-12 hard ciders per day, with the last drink being around 10 pm last night. Reports he has also been heavily using caffeine. He estimates he consumes anywhere 90 mg some days to over 400 mg on other days. Pt denies any SI, HI, NSSI, AH or VH. Does endorse a hx of AH and delusions, but not for years. He reports he has been compliant with his medication. Pt reports he has been residing at a group home, but is not sure if he is able to return there after they have found out about his use that he has been hiding. Pt reportedly was sneaking alcohol into the GH. He does endorse some anxiety about what will be the next step for him as he tries to \"figure it all out.\" His is open to going to MICD residential treatment after detox and is interested in [facility]. Pt appears alert, engaged and fully oriented. Pt presents with his parents, his father, who is a physician, and his mother, who is a nurse. Parents express concern for pt's mental health history and feel pt would benefit from more support than he is getting in the group home. Report pt would benefit from an MICD residential " program, but report they have had bad experiences with past treatment centers as they have not provided to right amount of support for his MH needs. 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    Informed Consent and Assessment Methods  Explained the crisis assessment process, including applicable information disclosures and limits to confidentiality, assessed understanding of the process, and obtained consent to proceed with the assessment.  Assessment methods included conducting a formal interview with patient, review of medical records, collaboration with medical staff, and obtaining relevant collateral information from family and community providers when available.  : done     Patient response to interventions: acceptance expressed, verbalizes understanding  Coping skills were attempted to reduce the crisis:  Pt reports he came forward to staff at his group home and parents about his use and to get help with detox and treatment     History of the Crisis   Pt has a hx of schizoaffective disorder, depression, anxiety, PTSD, and alcohol use disorder. Reports he has also struggled with eating disorders in the past. Pt is currently residing at a group home. Reports there are 3 other residents in the home, with usually 1-2 staff on duty. Reports he has a medication managment provider through [facility] - Dr. Mcdermott. Has an independent  that his parents hired that has been working with him for the past 5 years, Dom Garcia through [facility]. Both Dom Garcia and Dr. Mcdermott would like to be kept updated with patient's care and how they can help support him. Pt is agreeable to this and has signed ROIs for both providers. Pt reports he has another worker through [facility] and Jonah Aragon through [employer]. Reports Jonah has been working on getting him into alternative  housing. Hx of previous CD treatment. Reports starting in Fall 2022 he was at [address] [place] [address] for 2 weeks, then went straight to [facility] in [place], then went to [facility] in [place] until January 2023, followed by [facility] until he was finally placed at his current group home. He does not want to return to any of these previous treatment centers, but is open to going to anywhere else.    Brief Psychosocial History  Family:  Single, Children no  Support System:  Parent(s)  Employment Status:  disabled  Source of Income:  disability  Financial Environmental Concerns:  none  Current Hobbies:  music, television/movies/videos  Barriers in Personal Life:  mental health concerns (substance use concerns)    Significant Clinical History  Current Anxiety Symptoms:  anxious  Current Depression/Trauma:   (none)  Current Somatic Symptoms:  anxious  Current Psychosis/Thought Disturbance:   (none)  Current Eating Symptoms:   (no change)  Chemical Use History:  Alcohol: Daily  Last Use:: 01/05/24  Benzodiazepines: None  Opiates: None  Cocaine: None  Marijuana: Occasional (reports he drank 2 THC drinks 2 days ago)  Last Use:: 01/03/24  Other Use: Other (comments) (caffeine)  Last Use:: 01/05/24  Withdrawal Symptoms:  (none reported)  Addictions:  (none reported)   Past diagnosis:  PTSD, Substance Use Disorder, Bipolar Disorder, Schizophrenia, Depression, Anxiety Disorder  Family history:  No known history of mental health or chemical health concerns  Past treatment:  Individual therapy, Case management, Psychiatric Medication Management, Residential Treatment, Inpatient Hospitalization  Details of most recent treatment:  Pt currently has established med management through [facility]. Has 3 different case workers, but is primarily working with Dom Garcia [phone]. See above. Pt currently residing in a group home. Has been to various CD treatments within the past 2 years.  "Compliant with medications.     Collateral Information  Is there collateral information: Yes     Collateral information name, relationship, phone number:  Atilio Murillo, parents, [phone]    What happened today: Report pt had his first major psychotic break in college. They report pt was hospitalized and afterwards struggled to finish school. They report pt completed about 16 credits short of a degree. Has been doing odd jobs off and on since. Report similar timeline to what pt reports in interview. Hx of falling into the wrong crowd with drug use. Reports they don't believe pt has ever used any hard drugs, mostly THC and alcohol. 2022, pt reportedly fell into the wrong crowd, was given mushrooms and had a \"bad trip\" that lasted for about 2 weeks. This is when pt started his journey starting at [address], followed by 2 CD treatments, [facility] and now his current group home. They report pt has a hx of concerning behavior due to MH, such as running off into the woods for a spiritual awakening or walking in the freezing cold in only moccasins and a light coat to go get beer. They report pt could have . Nothing recently though. More recently it seems the alcohol use has been a concern and pt was not open about it with them or staff until Tuesday of this week. Reports they have kept in regular contact with pt's provider at [facility] who recommended detox followed by treatment. Dom Garcia, pt's , is also a good support and would like to remain involved. Parents would like to keep in touch with pt and be updated on cares, which pt is agreeable to. Reports pt's younger brother just had his first son, so they have seen pt has been more motivated to get help to be a good uncle.     What is different about patient's functioning: Worsening alcohol use     Concern about alcohol/drug use: yes, heavy alcohol use    What do you think the patient needs: detox and MICD treatment    Has " patient made comments about wanting to kill themselves/others: no    If d/c is recommended, can they take part in safety/aftercare planning:  yes    Risk Assessment  [place] Suicide Severity Rating Scale Full Clinical Version:  Suicidal Ideation  Q1 Wish to be Dead (Lifetime): No  Q2 Non-Specific Active Suicidal Thoughts (Lifetime): No     Suicidal Behavior (Lifetime)  Actual Attempt (Lifetime): No  Has subject engaged in non-suicidal self-injurious behavior? (Lifetime): No  Interrupted Attempts (Lifetime): No  Aborted or Self-Interrupted Attempt (Lifetime): No  Preparatory Acts or Behavior (Lifetime): No    [place] Suicide Severity Rating Scale Recent:   Suicidal Ideation (Recent)  Q1 Wished to be Dead (Past Month): no  Q2 Suicidal Thoughts (Past Month): no     Suicidal Behavior (Recent)  Actual Attempt (Past 3 Months): No  Total Number of Actual Attempts (Past 3 Months): 0  Has subject engaged in non-suicidal self-injurious behavior? (Past 3 Months): No  Interrupted Attempts (Past 3 Months): No  Total Number of Interrupted Attempts (Past 3 Months): 0  Aborted or Self-Interrupted Attempt (Past 3 Months): No  Total Number of Aborted or Self-Interrupted Attempts (Past 3 Months): 0  Preparatory Acts or Behavior (Past 3 Months): No  Total Number of Preparatory Acts (Past 3 Months): 0    Environmental or Psychosocial Events: impulsivity/recklessness, ongoing abuse of substances, unemployment/underemployment  Protective Factors: Protective Factors: strong bond to family unit, community support, or employment, lives in a responsibly safe and stable environment, help seeking, supportive ongoing medical and mental health care relationships    Does the patient have thoughts of harming others? Feels Like Hurting Others: no  Previous Attempt to Hurt Others: no  Current presentation:  (calm and cooperative)  Is the patient engaging in sexually inappropriate behavior?: no    Is the patient engaging in sexually inappropriate  behavior?  no        Mental Status Exam   Affect: Appropriate  Appearance: Appropriate  Attention Span/Concentration: Attentive  Eye Contact: Engaged    Fund of Knowledge: Appropriate   Language /Speech Content: Fluent  Language /Speech Volume: Normal  Language /Speech Rate/Productions: Normal  Recent Memory: Intact  Remote Memory: Intact  Mood: Anxious  Orientation to Person: Yes   Orientation to Place: Yes  Orientation to Time of Day: Yes  Orientation to Date: Yes     Situation (Do they understand why they are here?): Yes  Psychomotor Behavior: Normal  Thought Content: Clear  Thought Form: Intact    Medication  Psychotropic medications:   Current Facility-Administered Medications   Medication    diazepam (VALIUM) tablet 5-20 mg    lactated ringers infusion     Current Outpatient Medications   Medication    hydrOXYzine HCl (ATARAX) 25 MG tablet    naltrexone (DEPADE/REVIA) 50 MG tablet    OLANZapine (ZYPREXA) 7.5 MG tablet      Current Care Team  Patient Care Team:  No Ref-Primary, Physician as PCP - General  Dom Garcia as   Adenike Mcdermott MD as Psychiatrist    Diagnosis  Patient Active Problem List   Diagnosis Code    Schizoaffective disorder, depressive type (H) F25.1    Alcohol use disorder F10.90       Primary Problem This Admission  Active Hospital Problems    Schizoaffective disorder, depressive type (H)      Alcohol use disorder        Clinical Summary and Substantiation of Recommendations   After therapeutic assessment and intervention by ED care team and LM and in consultation with attending provider, the patient's circumstances and mental state were appropriate for admission to detox. Pt may benefit from MICD bed placement on the unit. Pt presents seeking detox as he has been drinking approximately 10-12 hard ciders per day, with last drink being around 10 pm last night. He has been drinking daily and also reports heavily using caffeine as well. Pt denies any SI, HI, AH, VH or NSSI at the  time of assessment. Pt does have a hx of schizoaffective disorder and is prescribed olanzapine. He reports he has been compliant with his medication. No obvious signs of current keron or psychosis observed. He reports he can be safe on the unit and is agreeable to seeking MICD residential treatment once he is ready to discharge from detox. He is interested in going to [facility], but is open to suggestions where there are openings. Pt is currently a resident of a group home, though he is not sure if he is able to return there ([facility] [phone]). His parents feel it would be best for pt to complete CD treatment before returning to the group home.          Severe psychiatric, behavioral or other comorbid conditions are appropriate for management at inpatient mental health as indicated by at least one of the following: Comorbid substance use disorder  Severe dysfunction in daily living is present as indicated by at least one of the following: Other evidence of severe dysfunction  Situation and expectations are appropriate for inpatient care: Biopsychosocial stresses potentially contributing to clinical presentation (co morbidities) have been assessed and are absent or manageable at proposed level of care  Inpatient mental health services are necessary to meet patient needs and at least one of the following: Specific condition related to admission diagnosis is present and judged likely to deteriorate in absence of treatment at proposed level of care      Patient coping skills attempted to reduce the crisis:  Pt reports he came forward to staff at his group home and parents about his use and to get help with detox and treatment    Disposition  Recommended disposition: Detox        Reviewed case and recommendations with attending provider. Attending Name: Dr. Arabella Rivera       Attending concurs with disposition: yes       Patient and/or validated legal guardian concurs with disposition:   yes       Final disposition:   inpatient mental health    Legal status on admission: Voluntary/Patient has signed consent for treatment    Assessment Details   Total duration spent with the patient: 32 min     CPT code(s) utilized: 16444 - Psychotherapy for Crisis - 60 (30-74*) min    NICHELLE Rayo, Psychotherapist  [facility] - Triage & Transition Services  Callback: [phone]

## 2024-01-06 NOTE — TELEPHONE ENCOUNTER
2:34am - Paged [facility] for possible placement to [facility] 3A    3:02am - Provider Jorge accepts pt for detox 3A    3:06am - Notified Unit of pt in the queue.     3:12am - Notified ED of placement. Unit will call when ready for report.     Yamila Completed S.R    R: Patient placement to [facility] 3A/ [facility]

## 2024-01-06 NOTE — PLAN OF CARE
Behavioral Team Discussion: (1/6/2024)    Continued Stay Criteria/Rationale: Patient admitted for Chemical Use Issues.  Plan: The following services will be provided to the patient; psychiatric assessment, medication management, therapeutic milieu, individual and group support, and skills groups.   Participants: 3A Provider: Dr. aCrson Chadwick MD; 3A RN: Melissa Hardy RN; 3A CM's:  La Fu .  Summary/Recommendation: Providers will assess today for treatment recommendations, discharge planning, and aftercare plans. CM will meet with pt for discharge planning.   Medical/Physical: No medical/physical concerns reported by pt.   Precautions:   Behavioral Orders   Procedures    Code 1 - Restrict to Unit    Routine Programming     As clinically indicated    Status 15     Every 15 minutes.    Withdrawal precautions     Rationale for change in precautions or plan: N/A  Progress: Initial.    ASAM Dimension Scale Ratings:  Dimension 1: 2 Client has some difficulty tolerating and coping with withdrawal discomfort. Client's intoxication may be severe, but responds to support and treatment such that the client does not immediately endanger self or others. Client displays moderate signs and symptoms with moderate risk of severe withdrawal.  Dimension 2: 0 Client displays full functioning with good ability to cope with physical discomfort.  Dimension 3: 2 Client has difficulty with impulse control and lacks coping skills. Client has thoughts of suicide or harm to others without means; however, the thoughts may interfere with participation in some treatment activities. Client has difficulty functioning in significant life areas. Client has moderate symptoms of emotional, behavioral, or cognitive problems. Client is able to participate in most treatment activities.  Dimension 4: 2 Client displays verbal compliance, but lacks consistent behaviors; has low motivation for change; and is passively involved in treatment.  Dimension  5: 3 Client has poor recognition and understanding of relapse and recidivism issues and displays moderately high vulnerability for further substance use or mental health problems. Client has few coping skills and rarely applies coping skills.  Dimension 6: 3 Client is not engaged in structured, meaningful activity and the client's peers, family, significant other, and living environment are unsupportive, or there is significant criminal justice system involvement.

## 2024-01-07 PROCEDURE — 128N000004 HC R&B CD ADULT

## 2024-01-07 PROCEDURE — 250N000013 HC RX MED GY IP 250 OP 250 PS 637: Performed by: STUDENT IN AN ORGANIZED HEALTH CARE EDUCATION/TRAINING PROGRAM

## 2024-01-07 PROCEDURE — 128N000001 HC R&B CD/MH ADULT

## 2024-01-07 PROCEDURE — 250N000013 HC RX MED GY IP 250 OP 250 PS 637: Performed by: PSYCHIATRY & NEUROLOGY

## 2024-01-07 RX ADMIN — THIAMINE HCL TAB 100 MG 100 MG: 100 TAB at 08:48

## 2024-01-07 RX ADMIN — TRAZODONE HYDROCHLORIDE 50 MG: 50 TABLET ORAL at 20:22

## 2024-01-07 RX ADMIN — Medication 1 TABLET: at 08:48

## 2024-01-07 RX ADMIN — FOLIC ACID 1 MG: 1 TABLET ORAL at 08:48

## 2024-01-07 RX ADMIN — OLANZAPINE 7.5 MG: 5 TABLET, FILM COATED ORAL at 20:22

## 2024-01-07 ASSESSMENT — ACTIVITIES OF DAILY LIVING (ADL)
DRESS: INDEPENDENT
ADLS_ACUITY_SCORE: 28
ADLS_ACUITY_SCORE: 38
HYGIENE/GROOMING: INDEPENDENT
LAUNDRY: WITH SUPERVISION
ADLS_ACUITY_SCORE: 28
ORAL_HYGIENE: INDEPENDENT
ORAL_HYGIENE: INDEPENDENT
ADLS_ACUITY_SCORE: 28
ADLS_ACUITY_SCORE: 38
ADLS_ACUITY_SCORE: 38
ADLS_ACUITY_SCORE: 28
LAUNDRY: WITH SUPERVISION
ADLS_ACUITY_SCORE: 28
ADLS_ACUITY_SCORE: 38
DRESS: INDEPENDENT
ADLS_ACUITY_SCORE: 28
ADLS_ACUITY_SCORE: 28
ADLS_ACUITY_SCORE: 38
HYGIENE/GROOMING: INDEPENDENT

## 2024-01-07 NOTE — DISCHARGE INSTRUCTIONS
Behavioral Discharge Planning and Instructions  THANK YOU FOR CHOOSING [facility]  3A  [phone]    Summary: You were admitted to [facility] on 1/6 for detoxification from alcohol.  A medical exam was performed that included lab work. You have met with a  and opted to discharge to sober living.  Please take care and make your recovery a daily priority, Naeem!  It was a pleasure working with you and the entire treatment team here wishes you the very best in your recovery!     Recommendation:  Discharge to sober living and build positive relationships with housemates. Attend intake appointment for IOP on 1/18. Return to [place] or seek higher level of care if needed.     Main Diagnoses:  Per Dr. Farrukh MD;  Schizoaffective disorder, depressive type (H)   Alcohol use disorder, severe  Alcohol withdrawal with unspecified complication        Major Treatments, Procedures and Findings:  You were treated for alcohol detoxification using valium. As an outpatient you will be prescribed medication, please take this medication as prescribed until it is gone. You completed a chemical dependency assessment. You had labs drawn and those results were reviewed with you. Please take a copy of your lab work with you to your next primary care provider appointment.    Symptoms to Report:  If you experience more anxiety, confusion, sleeplessness, deep sadness or thoughts of suicide, notify your treatment team or notify your primary care provider. IF ANY OF THE SYMPTOMS YOU ARE EXPERIENCING ARE A MEDICAL EMERGENCY CALL 911 IMMEDIATELY.     Lifestyle Adjustment: Adjust your lifestyle to get enough sleep, relaxation, exercise and good nutrition. Continue to develop healthy coping skills to decrease stress and promote a sober living environment. Do not use mood altering substances including alcohol, illegal drugs or addictive medications other than what is currently prescribed.   Health Action Plan:  1.Create  a daily schedule  2. Eat Healthy  3. Plan Enjoyable Sober Activities  4. Use Problem Solving Skills and Deal with Issues as they Arise.   5. Be Physically Active  6. Take your medications as prescribed  7. Get enough restful sleep  8. Practice Relaxation  9. Spend time with Supportive People  10. No use of alcohol, illegal drugs or addictive medications other than what is currently prescribed.   11.AA, NA Sponsor are excellent resources for support  12. Explore how  you can utilize spirituality in your recovery     Disposition: Discharge to sober living    Facts about COVID19 at www.cdc.gov/COVID19 and www.[place].gov/covid19    Keeping hands clean is one of the most important steps we can take to avoid getting sick and spreading germs to others.  Please wash your hands frequently and lather with soap for at least 20 seconds!    Follow-up Appointment:     You are aware you should make a follow up appointment with your primary care doctor for medical and medication management      Patient will work with staff at [facility] to make appointments for psych and therapy.     Outpatient CD Treatment:   [facility]  Location: [address] [place] [address]  Date: 24  Time: 10am  Phone: [phone]     [facility] [place], [place]   Manager - Bob  [phone]  [facility] - [phone]    Patient was referred to [facility] and they are reviewing his referral. If a higher level of care is needed after discharge, follow up with [facility] at [phone]    Recovery apps for your phone to locate current in person and zoom recovery meetings  [place] - meeting cristine  AA  - meeting cristine  Meeting guide - meeting cristine  Quick NA meeting - meeting cristine  Anna- has various apps    Resources:  Some AA/NA meetings are being held online however most have returned to in-person or a hybrid combination please check online to verify*  Need Support Now? If you or  "someone you know is struggling or in crisis, help is available. Call or text 988 or chat 988Booktrack.org  AA meetings search for them at: https://aa-intergroup.org (worldwide meeting listings)  AA meetings for [place] area can be found online at: https://a[place].org (click local online meetings listings)  NA meetings for [place] area can be found online at: https://www.nam[place].org  (click find a meeting)  AA and NA Sponsors are excellent resources for support and you can find one at any support group meeting.   Alcoholics Anonymous (https://aa.org/): for information 24 hours/day  AA Intergroup service office in [place] (http://www.aastpaul.org/) 767.671.3585  AA Intergroup service office in [facility]: [phone]. (http://www.aaEvirx.org/)  Narcotics Anonymous (www.[place].org) (808) 453-5999  https://aafairviewriverside.org/meetings  SMART Recovery - self management for addiction recovery:  www.smartrecovery.org  Pathways ~ A Health Crisis Resource & Support Center:  729.177.4769.  https://prescribetoprevent.org/patient-education/videos/  http://www.harmreduction.org  Crisis Text Line  Text 594241  You will be connected with a trained live crisis counselor to provide support. Por espanol, texto  SUSHMA a 402172 o texto a 442-AYUDAME en WhatsApp  [place] Hope Line  1.800.SUICIDE [8789110]  [facility] [phone]  Support Group:  AA/NA and Sponsor/support.  Fast Tracker  Linking people to mental health and substance use disorder resources  [employer] Interactiven.org   [place] Mental Health Warm Line  Peer to peer support  Monday thru Saturday, 12 pm to 10 pm  212.747.7663 or 9.891.385.0208  Text \"Support\" to 38013  National [place] on Mental Illness (OREN)  637.344.7071 or 1.888.OREN.HELPS  Alcoholics Anonymous (www.alcoholics-anonymous.org): Check your phone book for your local chapter.  Suicide Awareness Voices of Education (SAVE) (www.save.org): 303-883-SAVE " (8289)  National Suicide Prevention Line (www.mentalhealthmn.org): 877-632-SHMF (1482)  Mental Health Consumer/Survivor Network of [place] (www.mhcsn.net): 705.610.8405 or 530-422-3596  Mental Health Association of [place] (www.mentalhealth.org): 632.716.5467 or 110-835-1838   Substance Abuse and Mental Health Services (www.samhsa.gov)  [place] Opioid Prevention Coalition: www.opioidcoalition.org    [place] Recovery Connection (MRC)  [facility] connects people seeking recovery to resources that help foster and sustain long-term recovery.  Whether you are seeking resources for treatment, transportation, housing, job training, education, health care or other pathways to recovery, [facility] is a great place to start.  651.466.1078.  www.[place]arecMaginy.[employer] Pod casts for nutrition and wellness  Listen on Apple Podcasts  Dishing Up Nutrition   [employer]   Nutrition       Understand the connection between what you eat and how you feel. Hosted by licensed nutritionists and dietitians from [employer] we share practical, real-life solutions for healthier living through nutrition.     General Medication Instructions:   See your medication sheet(s) for instructions.   Take all medications as prescribed.  Make no changes unless your primary care provider suggests them.   Go to all your primary care provider visits.  Be sure to have all your required lab tests. This way, your medicines can be refilled on time.  Do not use any forms of alcohol.    Please Note:  If you have any questions at anytime after you are discharged please call [facility] detox unit 3AW at [phone].  [facility], Behavioral Intake [phone]  Medical Records call [phone]  Outpatient Behavioral Intake call [phone]  LP+ Wait List/Bed Availability call [phone]    Please remember to take all of your behavioral discharge planning and lab paperwork to any follow up appointments, it contains  your lab results, diagnosis, medication list and discharge recommendations.      THANK YOU FOR CHOOSING  [employer] [place]

## 2024-01-07 NOTE — PLAN OF CARE
"Goal Outcome Evaluation:    Plan of Care Reviewed With: patient      Patient is up and visible in the milieu for breakfast. Patient is ambulating independently, balanced and steady. Patient is alert and oriented x 4. Patient is not attending/participating in unit programming. Pt is minimally social with peers. Affect is blunted/flat, mood is anxious/depressed. Patient rates anxiety a 3/10 and depression a 2/10 on the 1-10 severity scale. Patient denies SI/SIB/HI. Pt denies auditory/visual hallucinations. Patient verbally contracts for safety on the unit. Patient is tolerating medications well, denies any current side effects.     Pt's MSSA = 1 upon first morning withdrawal assessment. Patient has not required withdrawal medication while on the unit. Patient reports a good appetite, and good sleep. Patient discharge plans pending. Rest, fluids, and food encouraged. Status 15 checks remain. Patient denies any unmet needs at this time.     Blood pressure 120/77, pulse 50, temperature 97.4  F (36.3  C), temperature source Temporal, resp. rate 18, height 1.803 m (5' 11\"), weight 87.5 kg (193 lb), SpO2 99%.   "

## 2024-01-07 NOTE — PROGRESS NOTES
S: Patient scored less than 8 for greater than 24 hours. Pt has required no medication for withdrawal for > 24 hours,.  B: Patient admitted for alcohol withdrawal and detoxification.  A: Patient stable in the alcohol withdrawal process AEB MSSA scores < 8 for > 24 hours.  R: Patient removed from detox status per unit Protocol.

## 2024-01-07 NOTE — PLAN OF CARE
Problem: Alcohol Withdrawal  Goal: Alcohol Withdrawal Symptom Control  Outcome: Progressing   Goal Outcome Evaluation:         Pt admitted for alcohol detox.   Pt is on MSSA with valium. He has not had any valium since admission last night.  Denying withdrawal symptoms, sleeping well. No pain.     MSSA :1, not requiring any valium.    B/P: 121/76, T: 97.7, P: 48, R: 18

## 2024-01-07 NOTE — PLAN OF CARE
Problem: Adult Behavioral Health Plan of Care  Goal: Plan of Care Review  Outcome: Progressing     Problem: Alcohol Withdrawal  Goal: Alcohol Withdrawal Symptom Control  Outcome: Progressing  Goal Outcome Evaluation:  Patient is alert and oriented x 4, independent of cares and all Adls. Patient denies SI/SIB/HI and AVH. Patient denies depression and anxiety. Patient remains calm, cooperative and appropriate. Patient had good mood, affect was bright. MSSA was 2 and 2, patient took tylenol 650 mg prn for headache, medication was effective. Patient took trazodone for sleep. Patient's meal consumption was adequate, he was medication compliant and no stated side effects. Will continue to monitor.

## 2024-01-08 PROCEDURE — 128N000001 HC R&B CD/MH ADULT

## 2024-01-08 PROCEDURE — 250N000013 HC RX MED GY IP 250 OP 250 PS 637: Performed by: STUDENT IN AN ORGANIZED HEALTH CARE EDUCATION/TRAINING PROGRAM

## 2024-01-08 PROCEDURE — 250N000013 HC RX MED GY IP 250 OP 250 PS 637: Performed by: PSYCHIATRY & NEUROLOGY

## 2024-01-08 PROCEDURE — 99233 SBSQ HOSP IP/OBS HIGH 50: CPT | Performed by: PSYCHIATRY & NEUROLOGY

## 2024-01-08 RX ADMIN — Medication 1 TABLET: at 08:38

## 2024-01-08 RX ADMIN — THIAMINE HCL TAB 100 MG 100 MG: 100 TAB at 08:38

## 2024-01-08 RX ADMIN — TRAZODONE HYDROCHLORIDE 50 MG: 50 TABLET ORAL at 22:18

## 2024-01-08 RX ADMIN — FOLIC ACID 1 MG: 1 TABLET ORAL at 08:38

## 2024-01-08 RX ADMIN — OLANZAPINE 7.5 MG: 5 TABLET, FILM COATED ORAL at 22:18

## 2024-01-08 ASSESSMENT — ACTIVITIES OF DAILY LIVING (ADL)
HYGIENE/GROOMING: INDEPENDENT
ADLS_ACUITY_SCORE: 28
ADLS_ACUITY_SCORE: 28
ORAL_HYGIENE: INDEPENDENT
ADLS_ACUITY_SCORE: 28
LAUNDRY: WITH SUPERVISION
ADLS_ACUITY_SCORE: 28
DRESS: INDEPENDENT
ADLS_ACUITY_SCORE: 28

## 2024-01-08 NOTE — PLAN OF CARE
"Problem: Adult Inpatient Plan of Care  Goal: Plan of Care Review    Outcome: Progressing    Goal Outcome Evaluation:    Plan of Care Reviewed With: patient    Patient is up and visible in the milieu. Patient is ambulating independently, balanced and steady. Patient is alert and oriented x 4. Patient is attending/participating in unit programming. Pt is social with peers. Affect is blunted/flat, mood is calm. Patient reports mild anxiety, rating it a 2/10 and depression a 2/10 on the 1-10 severity scale. Patient denies SI/SIB/HI. Pt denies auditory/visual hallucinations. Patient verbally contracts for safety on the unit. Patient is tolerating medications well, denies any current side effects.     Patient remains out of detox monitoring status. Patient reports a good appetite, and good sleep. Patient discharge plans pending. Patient has not yet completed discharge paperwork, patient was encouraged to do so. Rest, fluids, and food encouraged. Status 15 checks remain. Patient denies any unmet needs at this time.     Blood pressure 117/77, pulse 56, temperature 97.1  F (36.2  C), temperature source Temporal, resp. rate 18, height 1.803 m (5' 11\"), weight 87.5 kg (193 lb), SpO2 98%.   "

## 2024-01-08 NOTE — PLAN OF CARE
"G    Problem: Alcohol Withdrawal  Goal: Alcohol Withdrawal Symptom Control  Outcome: Progressing      Patient was isolative and withdrawn to his room at the beginning of the shift but later came out for group session and meal. He had no complaint of pain or discomfort, ate 100% of meal with adequate fluid intake, no nausea or stomach discomfort. Patient affect remained calm with bright affect upon approached. Patient have no complaint of anxiety and depression, no aggressive or assaultive behavior noted. Patient denies auditory and visual hallucination, denies SI/SIB and HI, contract for safety and medication compliant.    /72 (BP Location: Right arm)   Pulse 68   Temp 96.9  F (36.1  C) (Temporal)   Resp 16   Ht 1.803 m (5' 11\")   Wt 87.5 kg (193 lb)   SpO2 98%   BMI 26.92 kg/m                         "

## 2024-01-08 NOTE — PROGRESS NOTES
"Writer met with patient to discuss aftercare and discharge planning. Patient states he is considering IOP with lodging/sober living or residential. Patient reports he does not want to go back to his group home, as he thinks he will be in a negative head space there and have \"ruminating flashbacks\". Patient did his paperwork for assessment and a consult was placed.     Writer spoke to patients gina Wallace who states she would like to be kept updated on the plan. Patient mentions he has an arson charge that prevents him from being accepting to some residential treatment programs, but no arson is found on his criminal history record (more will be researched on this tomorrow). Parents are legal power of  per patient and parents, but they state they do not have paperwork for this (they seem to be on the same page about the plan so I didn't put much concern into this issue).    Plan: have assessment on 1/9 and place referrals    Residential:  [facility] (patients choice)  [facility] (parents choice)    IOP w/sober living:  [facility] (patients choice)    ROIS on file for:   Gina Sanchez - [phone]  Dad Solomon - [phone]   Dom Garcia - [phone] (writer left  on 1/8)    "
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
## 2024-01-08 NOTE — PROGRESS NOTES
"[facility], [place]   Psychiatric Progress Note        Interim History:   The patient's care was discussed with the treatment team during the daily team meeting and/or staff's chart notes were reviewed.  Staff report patient is out of detox. Appears quite vulnerable. Parents may be cary of  and report that patient did best when on a WALLACE.     The patient reports that he is feeling groggy from the Trazodone but is otherwise \"pretty good.\" Did sleep a lot. Concentration is poor. Mood is \"blah.\" Denies SI. Says that even if his group home would take him back, he doesn't think it would be a good idea as he was \"dishonest there.\" Asked about his parents being POA and he says, \"they haven't enacted it yet.\" Discussed being on WALLACE and he feels that it \"worked against me\" when he was on Sustenna. Is open to MICD treatment and \"help with housing.\"          Medications:      folic acid  1 mg Oral Daily    multivitamin w/minerals  1 tablet Oral Daily    OLANZapine  7.5 mg Oral At Bedtime    thiamine  100 mg Oral Daily          Allergies:     Allergies   Allergen Reactions    Penicillins Rash          Labs:   No results found for this or any previous visit (from the past 24 hour(s)).       Psychiatric Examination:     /77 (BP Location: Left arm)   Pulse 56   Temp 97.1  F (36.2  C) (Temporal)   Resp 18   Ht 1.803 m (5' 11\")   Wt 87.5 kg (193 lb)   SpO2 98%   BMI 26.92 kg/m    Weight is 193 lbs 0 oz  Body mass index is 26.92 kg/m .  Orthostatic Vitals       None              Appearance: awake, alert and adequately groomed  Attitude:  cooperative  Eye Contact:  fair  Mood:   \"blah\"  Affect:  intensity is blunted  Speech:  clear, coherent  Psychomotor Behavior:  no evidence of tardive dyskinesia, dystonia, or tics  Thought Process:  goal oriented  Associations:  no loose associations  Thought Content:  no evidence of suicidal ideation or homicidal ideation and no evidence of " "psychotic thought  Insight:  partial  Judgement:  fair  Oriented to:  time, person, and place  Attention Span and Concentration:  intact  Recent and Remote Memory:  fair           Precautions:     Behavioral Orders   Procedures    Code 1 - Restrict to Unit    Routine Programming     As clinically indicated    Status 15     Every 15 minutes.    Withdrawal precautions          DIagnoses:     Schizoaffective disorder, depressive type (H)   Alcohol use disorder, severe  Alcohol withdrawal with unspecified complication     Clinically Significant Risk Factors                      # Overweight: Estimated body mass index is 26.92 kg/m  as calculated from the following:    Height as of this encounter: 1.803 m (5' 11\").    Weight as of this encounter: 87.5 kg (193 lb)., PRESENT ON ADMISSION     # Financial/Environmental Concerns: none                 Plan:     1) Patient detoxified with MSSA protocol.   2) Continue Zyprexa 7.5mg at bedtime. Patient declined to change to Invega Sustenna, his previous WALLACE.   3) Patient open to considering Campral.   4) Patient open to MICD treatment and/or housing with outpatient.       Disposition Plan   Reason for ongoing admission: requires detoxification from substance that poses a risk of bodily harm during withdrawal period  Discharge location: Chemical dependency treatment facility  Discharge Medications: not ordered  Follow-up Appointments: not scheduled  Legal Status: voluntary    Entered by: Carson Chadwick MD on January 8, 2024 at 2:21 PM         "

## 2024-01-08 NOTE — PLAN OF CARE
Goal Outcome Evaluation:  Problem: Sleep Disturbance  Goal: Adequate Sleep/Rest  Outcome: Progressing  Patient is out of detox observation for alcohol withdrawal and is observed to sleep throughout the noc.

## 2024-01-09 PROCEDURE — 128N000001 HC R&B CD/MH ADULT

## 2024-01-09 PROCEDURE — 250N000013 HC RX MED GY IP 250 OP 250 PS 637: Performed by: STUDENT IN AN ORGANIZED HEALTH CARE EDUCATION/TRAINING PROGRAM

## 2024-01-09 PROCEDURE — 250N000013 HC RX MED GY IP 250 OP 250 PS 637: Performed by: PSYCHIATRY & NEUROLOGY

## 2024-01-09 PROCEDURE — H2032 ACTIVITY THERAPY, PER 15 MIN: HCPCS

## 2024-01-09 RX ADMIN — THIAMINE HCL TAB 100 MG 100 MG: 100 TAB at 08:29

## 2024-01-09 RX ADMIN — OLANZAPINE 7.5 MG: 5 TABLET, FILM COATED ORAL at 20:16

## 2024-01-09 RX ADMIN — FOLIC ACID 1 MG: 1 TABLET ORAL at 08:29

## 2024-01-09 RX ADMIN — TRAZODONE HYDROCHLORIDE 50 MG: 50 TABLET ORAL at 20:16

## 2024-01-09 RX ADMIN — Medication 1 TABLET: at 08:29

## 2024-01-09 ASSESSMENT — ACTIVITIES OF DAILY LIVING (ADL)
ADLS_ACUITY_SCORE: 28
ORAL_HYGIENE: INDEPENDENT
ADLS_ACUITY_SCORE: 28
LAUNDRY: WITH SUPERVISION
DRESS: INDEPENDENT
ADLS_ACUITY_SCORE: 28
ADLS_ACUITY_SCORE: 28
HYGIENE/GROOMING: INDEPENDENT
HYGIENE/GROOMING: INDEPENDENT
ADLS_ACUITY_SCORE: 28
LAUNDRY: WITH SUPERVISION
DRESS: INDEPENDENT
ADLS_ACUITY_SCORE: 28
ADLS_ACUITY_SCORE: 28
ORAL_HYGIENE: INDEPENDENT

## 2024-01-09 NOTE — PLAN OF CARE
Problem: Sleep Disturbance  Goal: Adequate Sleep/Rest  Outcome: Progressing   Goal Outcome Evaluation:    Patient is out of detox observation for alcohol withdrawal and is observed to sleep throughout the noc.

## 2024-01-09 NOTE — PLAN OF CARE
"Goal Outcome Evaluation:    Plan of Care Reviewed With: patient        Bob was up ad donovan, spent most of the shift in the milieu. Pt was withdrawn from peers mostly, pleasant on approach. Staff observed Pt exercising in the lounge area when others were not present. Pt was lifting chairs up and down. Staff asked him to refrain from lifting chairs and he cooperated. Writer followed up with Bob and he said he likes to exercise \"I used chairs for enough weight resistance\". Pt assured writer he would refrain from using furniture for weights, \"ok, I will do push ups then'    Pt denied having suicidal ideation or self harm thoughts. Pt was med adherent, cooperative.            "

## 2024-01-09 NOTE — PROGRESS NOTES
"Writer met with patient and sent referrals to [facility] and [facility] for inpatient programming.     ROIs on file    Writer spoke with patient's parents (DEANN on file) who explained patient's history at length. Parents states they are constantly hoping for a [facility] order for patient, as he sometime does not take his medication. Parents are now saying they are not sure [facility] or [facility] would be good fits for patient as he was denied [facility] in the past (patient states he told [facility] intake about an arson charge that he does not actually have), and \"residential settings have always failed him medically\". Parents would like referrals sent to [facility] with lodging as well, and patient agrees with this as this was his initial plan. Writer faxed referrals to the following places:     Residential:   [facility]    Outpatient with sober living:   [facility]  [facility]    Writer gave patient the list of sober homes connected with [facility] and [facility] to call to locate a sober home if he is leaning towards [facility] with lodging.   "

## 2024-01-09 NOTE — PLAN OF CARE
Goal Outcome Evaluation:      Problem: Adult Behavioral Health Plan of Care  Goal: Plan of Care Review  Outcome: Progressing       Patient is out of detox. Patient monitored during the shift. He appeared less distressed. Was in and out of his room multiple times. Appeared to socialize with peers. He denied having thoughts of hurting self or others. He did not endorse hallucinations. He denied physical health concerns. He took his medications as ordered and denied experiencing side effects. Patient currently in bed resting. Will continue to monitor.

## 2024-01-10 PROCEDURE — 250N000013 HC RX MED GY IP 250 OP 250 PS 637: Performed by: PSYCHIATRY & NEUROLOGY

## 2024-01-10 PROCEDURE — 99232 SBSQ HOSP IP/OBS MODERATE 35: CPT | Performed by: PSYCHIATRY & NEUROLOGY

## 2024-01-10 PROCEDURE — 250N000013 HC RX MED GY IP 250 OP 250 PS 637: Performed by: STUDENT IN AN ORGANIZED HEALTH CARE EDUCATION/TRAINING PROGRAM

## 2024-01-10 PROCEDURE — 128N000001 HC R&B CD/MH ADULT

## 2024-01-10 RX ADMIN — OLANZAPINE 7.5 MG: 5 TABLET, FILM COATED ORAL at 20:06

## 2024-01-10 RX ADMIN — FOLIC ACID 1 MG: 1 TABLET ORAL at 08:40

## 2024-01-10 RX ADMIN — Medication 1 TABLET: at 08:40

## 2024-01-10 RX ADMIN — TRAZODONE HYDROCHLORIDE 50 MG: 50 TABLET ORAL at 20:06

## 2024-01-10 RX ADMIN — THIAMINE HCL TAB 100 MG 100 MG: 100 TAB at 08:40

## 2024-01-10 ASSESSMENT — ACTIVITIES OF DAILY LIVING (ADL)
ADLS_ACUITY_SCORE: 28
DRESS: STREET CLOTHES;INDEPENDENT
ADLS_ACUITY_SCORE: 28
LAUNDRY: WITH SUPERVISION
ADLS_ACUITY_SCORE: 28
HYGIENE/GROOMING: INDEPENDENT
ADLS_ACUITY_SCORE: 28
HYGIENE/GROOMING: HANDWASHING;INDEPENDENT
ORAL_HYGIENE: INDEPENDENT
ADLS_ACUITY_SCORE: 28
DRESS: INDEPENDENT
ADLS_ACUITY_SCORE: 28
ORAL_HYGIENE: INDEPENDENT
ADLS_ACUITY_SCORE: 28
ADLS_ACUITY_SCORE: 28

## 2024-01-10 NOTE — PROGRESS NOTES
"[facility], [place]   Psychiatric Progress Note        Interim History:   The patient's care was discussed with the treatment team during the daily team meeting and/or staff's chart notes were reviewed.  Staff report patient is out of detox. Referred to [facility] and also calling sober housing and [facility].     The patient reports that he is \"pretty good.\" Sleeping better with Trazodone and plans to taper off of this in the future. Eating better. Denies SI or HI. Denies any psychosis. Has started calling sober housing programs.          Medications:      folic acid  1 mg Oral Daily    multivitamin w/minerals  1 tablet Oral Daily    OLANZapine  7.5 mg Oral At Bedtime    thiamine  100 mg Oral Daily          Allergies:     Allergies   Allergen Reactions    Penicillins Rash          Labs:   No results found for this or any previous visit (from the past 24 hour(s)).       Psychiatric Examination:     /66   Pulse 64   Temp 97.4  F (36.3  C) (Temporal)   Resp 16   Ht 1.803 m (5' 11\")   Wt 87.5 kg (193 lb)   SpO2 97%   BMI 26.92 kg/m    Weight is 193 lbs 0 oz  Body mass index is 26.92 kg/m .  Orthostatic Vitals       None              Appearance: awake, alert and adequately groomed  Attitude:  cooperative  Eye Contact:  good  Mood:  good  Affect:  intensity is blunted, more full at times  Speech:  clear, coherent  Psychomotor Behavior:  no evidence of tardive dyskinesia, dystonia, or tics  Thought Process:  goal oriented  Associations:  no loose associations  Thought Content:  no evidence of suicidal ideation or homicidal ideation and no evidence of psychotic thought  Insight:  fair  Judgement:  fair  Oriented to:  time, person, and place  Attention Span and Concentration:  intact  Recent and Remote Memory:  fair           Precautions:     Behavioral Orders   Procedures    Code 1 - Restrict to Unit    Routine Programming     As clinically indicated    Status 15     Every 15 minutes.    " "Withdrawal precautions          DIagnoses:     Schizoaffective disorder, depressive type (H)   Alcohol use disorder, severe  Alcohol withdrawal with unspecified complication     Clinically Significant Risk Factors                      # Overweight: Estimated body mass index is 26.92 kg/m  as calculated from the following:    Height as of this encounter: 1.803 m (5' 11\").    Weight as of this encounter: 87.5 kg (193 lb).        # Financial/Environmental Concerns: none                 Plan:     1) Patient detoxified with [facility] protocol.   2) Continue Zyprexa 7.5mg at bedtime. Patient declined to change to Invega Sustenna, his previous WALLACE.   3) Patient open to considering Campral. Will provide this at discharge.   4) Patient open to MICD treatment and/or housing with outpatient.       Disposition Plan   Reason for ongoing admission: requires detoxification from substance that poses a risk of bodily harm during withdrawal period  Discharge location: Chemical dependency treatment facility  Discharge Medications: not ordered  Follow-up Appointments: not scheduled  Legal Status: voluntary    Entered by: Carson Chadwick MD on January 10, 2024 at 1:35 PM         "

## 2024-01-10 NOTE — PLAN OF CARE
Plan of Care Reviewed With: patient  Outcome: Progressing   Goal Outcome Evaluation:    Problem: Adult Inpatient Plan of Care  Goal: Plan of Care Review  Description: The Plan of Care Review/Shift note should be completed every shift.  The Outcome Evaluation is a brief statement about your assessment that the patient is improving, declining, or no change.  This information will be displayed automatically on your shift  note.       Patient admitted MICD for alcohol withdrawal and concerns for schizoaffective disorder exacerbation. Pt is currently out of detox for alcohol.  He was visible on the unit, social with select peers and was napping in the afternoon.  He denies anxiety or depression, his affect is quite flat/blunted.  He reports he ate and slept well. He reports that he is working with [facility] to setup on IOP with sober housing.  He is disheveled and was encouraged to take a shower and change his clothes, but as of now has not done this.  Pt denies SI/SIB/HI/AVH.      Medical Concerns: denies.   Appetite: Pt ate 100% of both meals     Sleep: slept well.     PRN's given: None

## 2024-01-10 NOTE — PROGRESS NOTES
01/09/24 1800   Group Therapy Session   Group Attendance attended group session   Time Session Began 1645   Time Session Ended 1730   Total Time (minutes) 45   Total # Attendees 8   Group Type recreation   Group Topic Covered coping skills/lifestyle management   Group Session Detail healthy coping skills   Patient Response/Contribution cooperative with task   Patient Participation Detail Pt participated in Therapeutic Recreation group with focus on leisure education and acquisition of knowledge and skills. Pt was fully engaged and cooperative in group recreational intervention; leisure inventory. Pt was focused on the written portion for the first part of group and then contributed to the group discussion following. Pt discussed several recreational interests and positive coping skills that are healthy outlets. Pt mood was calm and was appropriate with interactions.

## 2024-01-10 NOTE — PROGRESS NOTES
Writer met with patient to discuss aftercare plans. Patient was referred to [facility] and [facility] and has the sober living lists to make calls to find sober housing. He was encouraged to call and find openings.     Writer left voicemails at [facility] and [facility] looking for a status update on referrals: (Patient was given these contacts as well and encouraged to make calls himself this afternoon if he does not hear).     '[facility]  [address] [place] [address]  Phone: [phone]    [facility]  [address] [facility] [address]  Phone: [phone]    [facility]  [address] [place] [address]  Phone: [phone]    [facility] [address], [place], [place] [id number] [phone]

## 2024-01-10 NOTE — PLAN OF CARE
Problem: Sleep Disturbance  Goal: Adequate Sleep/Rest  Outcome: Progressing   Goal Outcome Evaluation:  Patient is out of detox for alcohol withdrawal and is observed to sleep throughout the noc

## 2024-01-10 NOTE — PLAN OF CARE
"  Problem: Sleep Disturbance  Goal: Adequate Sleep/Rest  Outcome: Progressing  Patient is currently out of detox and is working with his case managerfinding placement. Patient says she has three options, he is looking at [place], [facility] or [facility]. He is still waiting on his  to find out the best option. Patient was up and visible in the milieu for most of the evening. He ate good dinner and took med as scheduled. Patient denied all psych symptoms and contracted to be safe. His vital signs were stable. Staff will continue to monitor and assist as needed.  /79 (BP Location: Left arm, Patient Position: Sitting, Cuff Size: Adult Regular)   Pulse 65   Temp 97.6  F (36.4  C) (Oral)   Resp 16   Ht 1.803 m (5' 11\")   Wt 87.5 kg (193 lb)   SpO2 98%   BMI 26.92 kg/m     "

## 2024-01-11 PROCEDURE — 250N000013 HC RX MED GY IP 250 OP 250 PS 637: Performed by: PSYCHIATRY & NEUROLOGY

## 2024-01-11 PROCEDURE — 99232 SBSQ HOSP IP/OBS MODERATE 35: CPT | Performed by: PSYCHIATRY & NEUROLOGY

## 2024-01-11 PROCEDURE — 128N000001 HC R&B CD/MH ADULT

## 2024-01-11 PROCEDURE — 250N000013 HC RX MED GY IP 250 OP 250 PS 637: Performed by: STUDENT IN AN ORGANIZED HEALTH CARE EDUCATION/TRAINING PROGRAM

## 2024-01-11 RX ADMIN — OLANZAPINE 7.5 MG: 5 TABLET, FILM COATED ORAL at 20:29

## 2024-01-11 RX ADMIN — THIAMINE HCL TAB 100 MG 100 MG: 100 TAB at 08:34

## 2024-01-11 RX ADMIN — HYDROXYZINE HYDROCHLORIDE 25 MG: 25 TABLET, FILM COATED ORAL at 17:06

## 2024-01-11 RX ADMIN — FOLIC ACID 1 MG: 1 TABLET ORAL at 08:34

## 2024-01-11 RX ADMIN — Medication 1 TABLET: at 08:34

## 2024-01-11 RX ADMIN — TRAZODONE HYDROCHLORIDE 50 MG: 50 TABLET ORAL at 20:29

## 2024-01-11 ASSESSMENT — ACTIVITIES OF DAILY LIVING (ADL)
ADLS_ACUITY_SCORE: 28
ORAL_HYGIENE: INDEPENDENT
ADLS_ACUITY_SCORE: 28
ADLS_ACUITY_SCORE: 28
HYGIENE/GROOMING: INDEPENDENT
ADLS_ACUITY_SCORE: 28
DRESS: INDEPENDENT
ADLS_ACUITY_SCORE: 28
LAUNDRY: WITH SUPERVISION
ADLS_ACUITY_SCORE: 28

## 2024-01-11 NOTE — PROGRESS NOTES
Met with the patient this morning to discuss his aftercare plan along with what was discussed prior.   The patient explained to this writer that he was calling different sober houses and wasn't sure if he had to set up an appointmen with them first or the IOP place.  This writer followed up with [facility] [address] and scheduled an intake appointment.    [address] [facility]  Location: [address] [place] [address]  Date: 1/18/24  Time: 10am  Phone: [phone]    Patient appeared to be excited about this news and starting calling more sober houses. This writer explained to him the process if they ask certain questions as the patient appeared to be unsure on the process.

## 2024-01-11 NOTE — PLAN OF CARE
Goal Outcome Evaluation:    Plan of Care Reviewed With: patient          Patient had a good shift, visible in milieu.  Flat affect, denies SI/SIB, depression/anxiety.  Patient currently out of detox from alcohol.  Good appetite, medication compliant.  Patient denies pain, no prn given this shift.  Patient looking forward to go to sober house tomorrow.  Patient resting comfortably in room.  Will continue to monitor closely.

## 2024-01-11 NOTE — PROGRESS NOTES
Patient declined the antabuse information given to him.  Told staff he's not interested in taking antabuse.

## 2024-01-11 NOTE — PLAN OF CARE
Problem: Sleep Disturbance  Goal: Adequate Sleep/Rest  Outcome: Progressing   Goal Outcome Evaluation:  Patient is out of detox for alcohol withdrawal and is observed to sleep throughout the noc,

## 2024-01-11 NOTE — PROGRESS NOTES
"[facility], [place]   Psychiatric Progress Note        Interim History:   The patient's care was discussed with the treatment team during the daily team meeting and/or staff's chart notes were reviewed.  Staff report patient is out of detox. Scheduled for an IOP intake next week. Calling for sober housing.     The patient reports that he is \"pretty good.\" Sleeping and eating well. Denies SI. Denies any psychosis. Says that he has been calling sober houses. Declines to be on Campral or Naltrexone but is open to considering Antabuse.          Medications:      folic acid  1 mg Oral Daily    multivitamin w/minerals  1 tablet Oral Daily    OLANZapine  7.5 mg Oral At Bedtime    thiamine  100 mg Oral Daily          Allergies:     Allergies   Allergen Reactions    Penicillins Rash          Labs:   No results found for this or any previous visit (from the past 24 hour(s)).       Psychiatric Examination:     /75   Pulse 55   Temp 97.4  F (36.3  C) (Temporal)   Resp 16   Ht 1.803 m (5' 11\")   Wt 87.5 kg (193 lb)   SpO2 99%   BMI 26.92 kg/m    Weight is 193 lbs 0 oz  Body mass index is 26.92 kg/m .  Orthostatic Vitals       None              Appearance: awake, alert and adequately groomed  Attitude:  cooperative  Eye Contact:  good  Mood:  good  Affect:  intensity is blunted, more full today  Speech:  clear, coherent  Psychomotor Behavior:  no evidence of tardive dyskinesia, dystonia, or tics  Thought Process:  goal oriented  Associations:  no loose associations  Thought Content:  no evidence of suicidal ideation or homicidal ideation and no evidence of psychotic thought  Insight:  fair  Judgement:  fair  Oriented to:  time, person, and place  Attention Span and Concentration:  intact  Recent and Remote Memory:  fair           Precautions:     Behavioral Orders   Procedures    Code 1 - Restrict to Unit    Routine Programming     As clinically indicated    Status 15     Every 15 " "minutes.    Withdrawal precautions          DIagnoses:     Schizoaffective disorder, depressive type (H)   Alcohol use disorder, severe  Alcohol withdrawal with unspecified complication     Clinically Significant Risk Factors                      # Overweight: Estimated body mass index is 26.92 kg/m  as calculated from the following:    Height as of this encounter: 1.803 m (5' 11\").    Weight as of this encounter: 87.5 kg (193 lb).        # Financial/Environmental Concerns: none                 Plan:     1) Patient detoxified with MSSA protocol.   2) Continue Zyprexa 7.5mg at bedtime. Patient declined to change to Invega Sustenna, his previous WALLACE.   3) Patient open to considering Antabuse. Will provide information about this.   4) Patient open to sober housing with IOP. Has IOP intake on 1/18.       Disposition Plan   Reason for ongoing admission: is unable to care for self due to severe psychosis or keron  Discharge location: Chemical dependency treatment facility  Discharge Medications: not ordered  Follow-up Appointments: not scheduled  Legal Status: voluntary    Entered by: Carson Chadwick MD on January 11, 2024 at 10:35 AM         "

## 2024-01-11 NOTE — PLAN OF CARE
Problem: Adult Behavioral Health Plan of Care  Goal: Plan of Care Review  Outcome: Progressing  Flowsheets (Taken 1/10/2024 9108)  Patient Agreement with Plan of Care: agrees   Patient continues to be out of detox .Today patient has a change of plan and will like to go to [facility] with lodging so he can be able to work and still get treatment.Patient reported mood as tired sleepy and crabby. He plans to get more follow up down following his discharge planning to [place]. He ate good dinner , took all his meds as scheduled , denied all psych symptoms and contracted for safety. Staff will continue to monitor and assist as needed.  /81 (BP Location: Left arm, Patient Position: Sitting, Cuff Size: Adult Regular)   Pulse 63   Temp 97.8  F (36.6  C) (Oral)   Resp 16   Ht 1.803 m (5'

## 2024-01-12 VITALS
SYSTOLIC BLOOD PRESSURE: 110 MMHG | DIASTOLIC BLOOD PRESSURE: 75 MMHG | RESPIRATION RATE: 16 BRPM | HEIGHT: 71 IN | WEIGHT: 193 LBS | HEART RATE: 78 BPM | BODY MASS INDEX: 27.02 KG/M2 | OXYGEN SATURATION: 96 % | TEMPERATURE: 97.1 F

## 2024-01-12 PROCEDURE — 99239 HOSP IP/OBS DSCHRG MGMT >30: CPT | Performed by: PSYCHIATRY & NEUROLOGY

## 2024-01-12 PROCEDURE — 250N000013 HC RX MED GY IP 250 OP 250 PS 637: Performed by: STUDENT IN AN ORGANIZED HEALTH CARE EDUCATION/TRAINING PROGRAM

## 2024-01-12 RX ORDER — MULTIPLE VITAMINS W/ MINERALS TAB 9MG-400MCG
1 TAB ORAL DAILY
Qty: 30 TABLET | Refills: 1 | Status: SHIPPED | OUTPATIENT
Start: 2024-01-12

## 2024-01-12 RX ORDER — HYDROXYZINE HYDROCHLORIDE 25 MG/1
25 TABLET, FILM COATED ORAL 2 TIMES DAILY PRN
Qty: 60 TABLET | Refills: 1 | Status: SHIPPED | OUTPATIENT
Start: 2024-01-12

## 2024-01-12 RX ORDER — OLANZAPINE 7.5 MG/1
7.5 TABLET, FILM COATED ORAL AT BEDTIME
Qty: 30 TABLET | Refills: 1 | Status: SHIPPED | OUTPATIENT
Start: 2024-01-12

## 2024-01-12 RX ORDER — FOLIC ACID 1 MG/1
1 TABLET ORAL DAILY
Qty: 30 TABLET | Refills: 1 | Status: SHIPPED | OUTPATIENT
Start: 2024-01-12

## 2024-01-12 RX ORDER — LANOLIN ALCOHOL/MO/W.PET/CERES
100 CREAM (GRAM) TOPICAL DAILY
Qty: 30 TABLET | Refills: 0 | Status: SHIPPED | OUTPATIENT
Start: 2024-01-12

## 2024-01-12 RX ORDER — TRAZODONE HYDROCHLORIDE 50 MG/1
50 TABLET, FILM COATED ORAL
Qty: 30 TABLET | Refills: 1 | Status: SHIPPED | OUTPATIENT
Start: 2024-01-12

## 2024-01-12 RX ADMIN — FOLIC ACID 1 MG: 1 TABLET ORAL at 08:33

## 2024-01-12 RX ADMIN — THIAMINE HCL TAB 100 MG 100 MG: 100 TAB at 08:33

## 2024-01-12 RX ADMIN — Medication 1 TABLET: at 08:33

## 2024-01-12 ASSESSMENT — ACTIVITIES OF DAILY LIVING (ADL)
ADLS_ACUITY_SCORE: 28
LAUNDRY: WITH SUPERVISION
ORAL_HYGIENE: INDEPENDENT
DRESS: STREET CLOTHES
ADLS_ACUITY_SCORE: 28
HYGIENE/GROOMING: INDEPENDENT

## 2024-01-12 NOTE — DISCHARGE SUMMARY
"Psychiatric Discharge Summary    Naeem Murillo MRN# [id number]   Age: 38 year old YOB: 1985     Date of Admission:  1/5/2024  Date of Discharge:  1/12/2024  Admitting Physician:  Carson Chadwick MD   Discharge Physician:  Carson Chadwick MD          Event Leading to Hospitalization:   The patient is a 37yo male with a history of alcohol use disorder and schizoaffective disorder who was admitted to detoxify from alcohol. He reports that he is \"all right.\" Is feeling tired and didn't get much sleep last night. Mood is anxious but denies any SI. Denies AH or VH. Denies nausea but doesn't have much of an appetite. Feels that Zyprexa makes him \"sleepy\". Is taking Naltrexone but isn't sure if it is helpful. Is open to CD treatment.         See Admission note for additional details.          Diagnoses:     Schizoaffective disorder, depressive type (H)   Alcohol use disorder, severe  Alcohol withdrawal with unspecified complication     Clinically Significant Risk Factors                      # Overweight: Estimated body mass index is 26.92 kg/m  as calculated from the following:    Height as of this encounter: 1.803 m (5' 11\").    Weight as of this encounter: 87.5 kg (193 lb).      # Financial/Environmental Concerns: none                Labs:        Lab Results   Component Value Date     01/05/2024    Lab Results   Component Value Date    CHLORIDE 106 01/05/2024    Lab Results   Component Value Date    BUN 6.6 01/05/2024      Lab Results   Component Value Date    POTASSIUM 3.8 01/05/2024    Lab Results   Component Value Date    CO2 25 01/05/2024    Lab Results   Component Value Date    CR 0.76 01/05/2024          Lab Results   Component Value Date    WBC 6.0 01/05/2024    HGB 15.3 01/05/2024    HCT 43.8 01/05/2024    MCV 92 01/05/2024     01/05/2024     Lab Results   Component Value Date    AST 19 01/05/2024    ALT 12 01/05/2024    ALKPHOS 65 01/05/2024    BILITOTAL 0.6 " "01/05/2024    RAMYA 25 01/05/2024     No results found for: \"TSH\"         Consults:   Consultation during this admission received from internal medicine:  Naeem Murillo is a 38 year old male admitted on 1/5/2024. He has a past medical history significant for schizoaffective disorder and alcohol use disorder. Admitted to station 3A for alcohol withdrawal.      Alcohol withdrawal, hx of alcohol abuse   Drinking 10-12 hard ciders/beers daily x 2.5 months. Denies hx of withdrawal seizures or DT.   - Continue MSSA   - Folvite, multi-vites, thiamine supplementation   - Further management per Psychiatry      Schizoaffective disorder  - Zyprexa 7.5 mg at bedtime, hydroxyzine 25 mg BID PRN      Hypomagnesemia  Mg 1.6. S/p IV mag sulfate 2g in the ED.         Hospital Course:   Naeem Murillo was admitted to Station 3A with attending Carson Chadwick MD as a voluntary patient. The patient was placed under status 15 (15 minute checks) to ensure patient safety.   MSSA protocol was initiated due to the patient's history of alcohol abuse and concern for withdrawal symptoms.  CBC, BMP and utox obtained.    All outpatient medications were continued with the exception of Naltrexone. The patient declined the offer of MAT for AUD. Did speak with the patient's father on the day of discharge. Discussed that patient was agreeable to continue on Zyprexa and to enter sober housing and [facility]. Parents have POA paperwork but haven't invoked it yet. Discussed that if patient does not succeed with this plan that we would consider petition for commitment with [facility].     Naeem Murillo did participate in groups and was visible in the milieu.     The patient's symptoms of withdrawal and disorganization improved.     Naeem Murillo was released to  sober housing . At the time of discharge Naeem Murillo was determined to not be a danger to himself or others. At the current time of discharge, the patient does not " meet criteria for involuntary hospitalization. On the day of discharge, the patient reports that they do not have suicidal or homicidal ideation and would never hurt themselves or others. Steps taken to minimize risk include: assessing patient s behavior and thought process daily during hospital stay, discharging patient with adequate plan for follow up for mental and physical health and discussing safety plan of returning to the hospital should the patient ever have thoughts of harming themselves or others. Therefore, based on all available evidence including the factors cited above, the patient does not appear to be at imminent risk for self-harm, and is appropriate for outpatient level of care.           Discharge Medications:     Current Discharge Medication List        START taking these medications    Details   folic acid (FOLVITE) 1 MG tablet Take 1 tablet (1 mg) by mouth daily  Qty: 30 tablet, Refills: 1    Associated Diagnoses: Alcohol use disorder      multivitamin w/minerals (THERA-VIT-M) tablet Take 1 tablet by mouth daily  Qty: 30 tablet, Refills: 1    Associated Diagnoses: Alcohol use disorder      thiamine (B-1) 100 MG tablet Take 1 tablet (100 mg) by mouth daily  Qty: 30 tablet, Refills: 0    Associated Diagnoses: Alcohol use disorder      traZODone (DESYREL) 50 MG tablet Take 1 tablet (50 mg) by mouth nightly as needed for sleep (may repeat after 60 minutes)  Qty: 30 tablet, Refills: 1    Associated Diagnoses: Schizoaffective disorder, depressive type (H)           CONTINUE these medications which have CHANGED    Details   hydrOXYzine HCl (ATARAX) 25 MG tablet Take 1 tablet (25 mg) by mouth 2 times daily as needed for anxiety  Qty: 60 tablet, Refills: 1    Associated Diagnoses: Schizoaffective disorder, depressive type (H)      OLANZapine (ZYPREXA) 7.5 MG tablet Take 1 tablet (7.5 mg) by mouth at bedtime  Qty: 30 tablet, Refills: 1    Associated Diagnoses: Schizoaffective disorder, depressive type  (H)           CONTINUE these medications which have NOT CHANGED    Details   naltrexone (DEPADE/REVIA) 50 MG tablet Take 50 mg by mouth daily                  Psychiatric Examination:   Appearance:  awake, alert and adequately groomed  Attitude:  cooperative  Eye Contact:  good  Mood:  good  Affect:  mood congruent  Speech:  clear, coherent  Psychomotor Behavior:  no evidence of tardive dyskinesia, dystonia, or tics  Thought Process:  goal oriented  Associations:  no loose associations  Thought Content:  no evidence of suicidal ideation or homicidal ideation and no evidence of psychotic thought  Insight:  fair  Judgment:  fair  Oriented to:  time, person, and place  Attention Span and Concentration:  intact  Recent and Remote Memory:  fair  Language: Able to read and write  Fund of Knowledge: appropriate  Muscle Strength and Tone: normal  Gait and Station: Normal         Discharge Plan:   Continue medications as above.     Recommendation:  Discharge to sober living and build positive relationships with housemates. Attend intake appointment for IOP on 1/18. Return to [facility] or seek higher level of care if needed.      Main Diagnoses:  Per Dr. Farrukh MD;  Schizoaffective disorder, depressive type (H)   Alcohol use disorder, severe  Alcohol withdrawal with unspecified complication         Major Treatments, Procedures and Findings:  You were treated for alcohol detoxification using valium. As an outpatient you will be prescribed medication, please take this medication as prescribed until it is gone. You completed a chemical dependency assessment. You had labs drawn and those results were reviewed with you. Please take a copy of your lab work with you to your next primary care provider appointment.     Symptoms to Report:  If you experience more anxiety, confusion, sleeplessness, deep sadness or thoughts of suicide, notify your treatment team or notify your primary care provider. IF ANY OF THE SYMPTOMS YOU ARE  EXPERIENCING ARE A MEDICAL EMERGENCY CALL 911 IMMEDIATELY.      Lifestyle Adjustment: Adjust your lifestyle to get enough sleep, relaxation, exercise and good nutrition. Continue to develop healthy coping skills to decrease stress and promote a sober living environment. Do not use mood altering substances including alcohol, illegal drugs or addictive medications other than what is currently prescribed.   Health Action Plan:  1.Create a daily schedule  2. Eat Healthy  3. Plan Enjoyable Sober Activities  4. Use Problem Solving Skills and Deal with Issues as they Arise.   5. Be Physically Active  6. Take your medications as prescribed  7. Get enough restful sleep  8. Practice Relaxation  9. Spend time with Supportive People  10. No use of alcohol, illegal drugs or addictive medications other than what is currently prescribed.   11.AA, NA Sponsor are excellent resources for support  12. Explore how  you can utilize spirituality in your recovery      Disposition: Discharge to sober living     Facts about COVID19 at www.cdc.gov/COVID19 and www.[place].gov/covid19     Keeping hands clean is one of the most important steps we can take to avoid getting sick and spreading germs to others.  Please wash your hands frequently and lather with soap for at least 20 seconds!     Follow-up Appointment:      You are aware you should make a follow up appointment with your primary care doctor for medical and medication management      [facility] to make appointments for psych and therapy.      Outpatient CD Treatment:   [facility]  Location: [address] [place] [address]  Date: 24  Time: 10am  Phone: [phone]      [facility] [place] [place] [address]   Manager - Bob - [phone]  LUIS Clemente - [phone]     Patient was referred to [facility] and they are reviewing his referral. If a higher level of care is needed after discharge, follow  up with [facility] at [phone]    Attestation:    The patient was seen and evaluated by me. I spent more than 30 minutes on discharge day activities. Carson Chadwick MD

## 2024-01-12 NOTE — PLAN OF CARE
Goal Outcome Evaluation:            Pt is out of detox and pt appeared sleeping most of the night shift and safety checks completed per protocol.

## 2024-01-12 NOTE — PROGRESS NOTES
Writer spoke with [facility] and they are unable to  patient - patient needs a cab to the below address if possible. Bob, the sober  works until 4pm and will meet patient at the house afterwards. Patient's assigned PRS Bryn will meet him at the sober home at 3:30pm to get him settled.     Cab ordered for 3pm     Drop off address:   [address] [place] [address]    AVS and nurse updated.

## 2024-01-12 NOTE — PROGRESS NOTES
"Writer met with patient to discuss aftercare and discharge planning. Patient appeared excited about finding a sober home - [facility]. The manager at [facility] is named Bob as well and he will reach out to case management with a  time for later today.     Outpatient MICD:  [address] [facility]  Location: [address] [place] [id number]  Date: 1/18/24  Time: 10am  Phone: [phone]:     Sober Home -   [facility]  Bob - [phone]    Writer returned patient's father's voicemail. Patient's father Dean is concerned about the area that [place] is in, and about patient \"still not getting on injectable medication\". Writer spoke with parents at length about the previous conversations where parents and patient agreed that inpatient settings have \"failed him\" in the past and outpatient with lodging should be considered, which is how this plan came about. Patients parents were upset to hear that patient was calling his own sober homes and case management was not assisting, and writer explained that sober homes often need to hear from the patient/client and that patients on 3a are encouraged to take the initiative on the discharge plans. Writer validated Dean's concerns about the medication, and patient living on his own. When speaking to the sober home manager, manager stated he runs a \"closely supervised ship\" and has patient assigned to a  who is \"attached to his hip\".   In writer's experience, this sober home seems more structured than some but Dean requests to speak to Dr. Chadwick and the message is passed on.     *Parents were reminded that the referral to [facility] is still in place, in case patient IOP w/sober living does not work out. ([facility] still reviewing - 2-3 week wait)    *Writer also spoke to patients  Dom Garcia (DEANN on file) - [phone] who stated \"we have tried so many things with Bob, I don't know what else " "to do\".  reports he agrees with the plan, but also [facility]       "

## 2024-01-12 NOTE — PLAN OF CARE
"  Problem: Adult Inpatient Plan of Care  Goal: Optimal Comfort and Wellbeing  Outcome: Progressing.  Patient is looking forward to discharging tomorrow, but he is not sure if the bed he found at [facility] will still be available. Patient will like to coordinate finding a treatment facility with his parents and .Patient was up and visible in the milieu this shift but continues to isolate to self. He attended the 7 pm AA group with witnessed participation , ate a good dinner  and was compliant with all his medications. Patient continues to deny all psych symptoms except anxiety which he rated at 6/10  requested and received hydroxyzine 25 mg with effect.  /80 (BP Location: Left arm)   Pulse 66   Temp 97.7  F (36.5  C) (Oral)   Resp 16   Ht 1.803 m (5' 11\")   Wt 87.5 kg (193 lb)   SpO2 99%   BMI 26.92 kg/m       "

## 2025-08-13 ENCOUNTER — LAB REQUISITION (OUTPATIENT)
Dept: LAB | Facility: CLINIC | Age: 40
End: 2025-08-13
Payer: MEDICARE

## 2025-08-20 ENCOUNTER — LAB REQUISITION (OUTPATIENT)
Dept: LAB | Facility: CLINIC | Age: 40
End: 2025-08-20
Payer: MEDICARE

## 2025-08-20 DIAGNOSIS — Z02.89 ENCOUNTER FOR OTHER ADMINISTRATIVE EXAMINATIONS: ICD-10-CM

## 2025-08-20 LAB
AMPHETAMINES UR QL SCN: NORMAL
BARBITURATES UR QL SCN: NORMAL
BENZODIAZ UR QL SCN: NORMAL
BZE UR QL SCN: NORMAL
CANNABINOIDS UR QL SCN: NORMAL
FENTANYL UR QL: NORMAL
OPIATES UR QL SCN: NORMAL
PCP QUAL URINE (ROCHE): NORMAL